# Patient Record
Sex: FEMALE | Race: BLACK OR AFRICAN AMERICAN | NOT HISPANIC OR LATINO
[De-identification: names, ages, dates, MRNs, and addresses within clinical notes are randomized per-mention and may not be internally consistent; named-entity substitution may affect disease eponyms.]

---

## 2021-06-03 ENCOUNTER — APPOINTMENT (OUTPATIENT)
Dept: OPHTHALMOLOGY | Facility: CLINIC | Age: 86
End: 2021-06-03

## 2021-06-21 PROBLEM — Z00.00 ENCOUNTER FOR PREVENTIVE HEALTH EXAMINATION: Status: ACTIVE | Noted: 2021-06-21

## 2021-06-22 ENCOUNTER — APPOINTMENT (OUTPATIENT)
Dept: OPHTHALMOLOGY | Facility: CLINIC | Age: 86
End: 2021-06-22
Payer: MEDICARE

## 2021-06-22 ENCOUNTER — NON-APPOINTMENT (OUTPATIENT)
Age: 86
End: 2021-06-22

## 2021-06-22 PROCEDURE — 92004 COMPRE OPH EXAM NEW PT 1/>: CPT

## 2021-07-08 ENCOUNTER — TRANSCRIPTION ENCOUNTER (OUTPATIENT)
Age: 86
End: 2021-07-08

## 2021-07-08 ENCOUNTER — APPOINTMENT (OUTPATIENT)
Dept: OPHTHALMOLOGY | Facility: CLINIC | Age: 86
End: 2021-07-08
Payer: MEDICARE

## 2021-07-08 ENCOUNTER — NON-APPOINTMENT (OUTPATIENT)
Age: 86
End: 2021-07-08

## 2021-07-08 PROCEDURE — 92025 CPTRIZED CORNEAL TOPOGRAPHY: CPT

## 2021-07-08 PROCEDURE — 92134 CPTRZ OPH DX IMG PST SGM RTA: CPT

## 2021-07-08 PROCEDURE — 92136 OPHTHALMIC BIOMETRY: CPT

## 2021-07-08 PROCEDURE — 92012 INTRM OPH EXAM EST PATIENT: CPT

## 2021-07-21 ENCOUNTER — NON-APPOINTMENT (OUTPATIENT)
Age: 86
End: 2021-07-21

## 2021-07-21 ENCOUNTER — APPOINTMENT (OUTPATIENT)
Dept: OPHTHALMOLOGY | Facility: CLINIC | Age: 86
End: 2021-07-21
Payer: MEDICARE

## 2021-07-21 PROCEDURE — 92014 COMPRE OPH EXAM EST PT 1/>: CPT

## 2021-07-21 PROCEDURE — 92235 FLUORESCEIN ANGRPH MLTIFRAME: CPT

## 2021-07-21 PROCEDURE — 92134 CPTRZ OPH DX IMG PST SGM RTA: CPT

## 2021-07-21 PROCEDURE — 92201 OPSCPY EXTND RTA DRAW UNI/BI: CPT

## 2021-08-19 ENCOUNTER — APPOINTMENT (OUTPATIENT)
Dept: OPHTHALMOLOGY | Facility: CLINIC | Age: 86
End: 2021-08-19

## 2021-08-20 ENCOUNTER — APPOINTMENT (OUTPATIENT)
Dept: OPHTHALMOLOGY | Facility: EYE CENTER | Age: 86
End: 2021-08-20

## 2021-08-21 ENCOUNTER — APPOINTMENT (OUTPATIENT)
Dept: OPHTHALMOLOGY | Facility: CLINIC | Age: 86
End: 2021-08-21

## 2021-09-07 ENCOUNTER — APPOINTMENT (OUTPATIENT)
Dept: CV DIAGNOSTICS | Facility: HOSPITAL | Age: 86
End: 2021-09-07
Payer: MEDICARE

## 2021-09-07 ENCOUNTER — OUTPATIENT (OUTPATIENT)
Dept: OUTPATIENT SERVICES | Facility: HOSPITAL | Age: 86
LOS: 1 days | End: 2021-09-07

## 2021-09-07 DIAGNOSIS — R07.9 CHEST PAIN, UNSPECIFIED: ICD-10-CM

## 2021-09-07 PROCEDURE — 93018 CV STRESS TEST I&R ONLY: CPT | Mod: GC

## 2021-09-07 PROCEDURE — 78452 HT MUSCLE IMAGE SPECT MULT: CPT | Mod: 26,MH

## 2021-09-07 PROCEDURE — 93016 CV STRESS TEST SUPVJ ONLY: CPT | Mod: GC

## 2021-10-21 ENCOUNTER — APPOINTMENT (OUTPATIENT)
Dept: OPHTHALMOLOGY | Facility: CLINIC | Age: 86
End: 2021-10-21
Payer: MEDICARE

## 2021-10-21 ENCOUNTER — NON-APPOINTMENT (OUTPATIENT)
Age: 86
End: 2021-10-21

## 2021-10-21 PROCEDURE — 99214 OFFICE O/P EST MOD 30 MIN: CPT

## 2021-11-03 ENCOUNTER — NON-APPOINTMENT (OUTPATIENT)
Age: 86
End: 2021-11-03

## 2021-11-03 ENCOUNTER — APPOINTMENT (OUTPATIENT)
Dept: OPHTHALMOLOGY | Facility: CLINIC | Age: 86
End: 2021-11-03
Payer: MEDICARE

## 2021-11-03 PROCEDURE — 92134 CPTRZ OPH DX IMG PST SGM RTA: CPT

## 2021-11-03 PROCEDURE — 67028 INJECTION EYE DRUG: CPT | Mod: LT

## 2021-11-03 PROCEDURE — 92012 INTRM OPH EXAM EST PATIENT: CPT | Mod: 25

## 2021-11-19 ENCOUNTER — APPOINTMENT (OUTPATIENT)
Dept: OPHTHALMOLOGY | Facility: EYE CENTER | Age: 86
End: 2021-11-19
Payer: MEDICARE

## 2021-11-19 ENCOUNTER — NON-APPOINTMENT (OUTPATIENT)
Age: 86
End: 2021-11-19

## 2021-11-19 PROCEDURE — 66984 XCAPSL CTRC RMVL W/O ECP: CPT | Mod: RT

## 2021-11-19 PROCEDURE — V2787: CPT | Mod: RT

## 2021-11-20 ENCOUNTER — NON-APPOINTMENT (OUTPATIENT)
Age: 86
End: 2021-11-20

## 2021-11-20 ENCOUNTER — APPOINTMENT (OUTPATIENT)
Dept: OPHTHALMOLOGY | Facility: CLINIC | Age: 86
End: 2021-11-20
Payer: MEDICARE

## 2021-11-20 PROCEDURE — 99024 POSTOP FOLLOW-UP VISIT: CPT

## 2021-11-24 ENCOUNTER — NON-APPOINTMENT (OUTPATIENT)
Age: 86
End: 2021-11-24

## 2021-11-24 ENCOUNTER — APPOINTMENT (OUTPATIENT)
Dept: OPHTHALMOLOGY | Facility: CLINIC | Age: 86
End: 2021-11-24
Payer: MEDICARE

## 2021-11-24 PROCEDURE — 99024 POSTOP FOLLOW-UP VISIT: CPT

## 2021-12-15 ENCOUNTER — APPOINTMENT (OUTPATIENT)
Dept: OPHTHALMOLOGY | Facility: CLINIC | Age: 86
End: 2021-12-15
Payer: MEDICARE

## 2021-12-15 ENCOUNTER — NON-APPOINTMENT (OUTPATIENT)
Age: 86
End: 2021-12-15

## 2021-12-15 PROCEDURE — 92134 CPTRZ OPH DX IMG PST SGM RTA: CPT

## 2021-12-15 PROCEDURE — 67028 INJECTION EYE DRUG: CPT | Mod: LT,79

## 2021-12-15 PROCEDURE — 92012 INTRM OPH EXAM EST PATIENT: CPT | Mod: 25,24

## 2021-12-21 ENCOUNTER — APPOINTMENT (OUTPATIENT)
Dept: OPHTHALMOLOGY | Facility: CLINIC | Age: 86
End: 2021-12-21
Payer: MEDICARE

## 2021-12-21 ENCOUNTER — NON-APPOINTMENT (OUTPATIENT)
Age: 86
End: 2021-12-21

## 2021-12-21 PROCEDURE — 99024 POSTOP FOLLOW-UP VISIT: CPT

## 2022-01-18 ENCOUNTER — NON-APPOINTMENT (OUTPATIENT)
Age: 87
End: 2022-01-18

## 2022-01-18 ENCOUNTER — APPOINTMENT (OUTPATIENT)
Dept: OPHTHALMOLOGY | Facility: CLINIC | Age: 87
End: 2022-01-18
Payer: MEDICARE

## 2022-01-18 PROCEDURE — 92014 COMPRE OPH EXAM EST PT 1/>: CPT | Mod: 24

## 2022-01-26 ENCOUNTER — NON-APPOINTMENT (OUTPATIENT)
Age: 87
End: 2022-01-26

## 2022-01-26 ENCOUNTER — APPOINTMENT (OUTPATIENT)
Dept: OPHTHALMOLOGY | Facility: EYE CENTER | Age: 87
End: 2022-01-26
Payer: MEDICARE

## 2022-01-26 PROCEDURE — 66984 XCAPSL CTRC RMVL W/O ECP: CPT | Mod: 79,LT

## 2022-01-26 PROCEDURE — V2787: CPT | Mod: LT

## 2022-01-27 ENCOUNTER — APPOINTMENT (OUTPATIENT)
Dept: OPHTHALMOLOGY | Facility: CLINIC | Age: 87
End: 2022-01-27
Payer: MEDICARE

## 2022-01-27 ENCOUNTER — NON-APPOINTMENT (OUTPATIENT)
Age: 87
End: 2022-01-27

## 2022-01-27 PROCEDURE — 99024 POSTOP FOLLOW-UP VISIT: CPT

## 2022-02-03 ENCOUNTER — NON-APPOINTMENT (OUTPATIENT)
Age: 87
End: 2022-02-03

## 2022-02-03 ENCOUNTER — APPOINTMENT (OUTPATIENT)
Dept: OPHTHALMOLOGY | Facility: CLINIC | Age: 87
End: 2022-02-03

## 2022-02-03 ENCOUNTER — APPOINTMENT (OUTPATIENT)
Dept: OPHTHALMOLOGY | Facility: CLINIC | Age: 87
End: 2022-02-03
Payer: MEDICARE

## 2022-02-03 PROCEDURE — 99024 POSTOP FOLLOW-UP VISIT: CPT

## 2022-03-03 ENCOUNTER — NON-APPOINTMENT (OUTPATIENT)
Age: 87
End: 2022-03-03

## 2022-03-03 ENCOUNTER — APPOINTMENT (OUTPATIENT)
Dept: OPHTHALMOLOGY | Facility: CLINIC | Age: 87
End: 2022-03-03
Payer: MEDICARE

## 2022-03-03 PROCEDURE — 99024 POSTOP FOLLOW-UP VISIT: CPT

## 2022-03-03 PROCEDURE — 92134 CPTRZ OPH DX IMG PST SGM RTA: CPT

## 2022-03-16 ENCOUNTER — NON-APPOINTMENT (OUTPATIENT)
Age: 87
End: 2022-03-16

## 2022-03-16 ENCOUNTER — APPOINTMENT (OUTPATIENT)
Dept: OPHTHALMOLOGY | Facility: CLINIC | Age: 87
End: 2022-03-16
Payer: MEDICARE

## 2022-03-16 PROCEDURE — 92012 INTRM OPH EXAM EST PATIENT: CPT | Mod: 25,24

## 2022-03-16 PROCEDURE — 92134 CPTRZ OPH DX IMG PST SGM RTA: CPT

## 2022-03-16 PROCEDURE — 67028 INJECTION EYE DRUG: CPT | Mod: LT,79

## 2022-03-31 ENCOUNTER — APPOINTMENT (OUTPATIENT)
Dept: OPHTHALMOLOGY | Facility: CLINIC | Age: 87
End: 2022-03-31
Payer: MEDICARE

## 2022-03-31 ENCOUNTER — NON-APPOINTMENT (OUTPATIENT)
Age: 87
End: 2022-03-31

## 2022-03-31 PROCEDURE — 66821 AFTER CATARACT LASER SURGERY: CPT | Mod: 79,RT

## 2022-05-04 ENCOUNTER — APPOINTMENT (OUTPATIENT)
Dept: OPHTHALMOLOGY | Facility: CLINIC | Age: 87
End: 2022-05-04
Payer: MEDICARE

## 2022-05-04 ENCOUNTER — NON-APPOINTMENT (OUTPATIENT)
Age: 87
End: 2022-05-04

## 2022-05-04 PROCEDURE — 92012 INTRM OPH EXAM EST PATIENT: CPT | Mod: 25,24

## 2022-05-04 PROCEDURE — 92134 CPTRZ OPH DX IMG PST SGM RTA: CPT

## 2022-05-04 PROCEDURE — 67210 TREATMENT OF RETINAL LESION: CPT | Mod: LT,78

## 2022-06-15 ENCOUNTER — APPOINTMENT (OUTPATIENT)
Dept: OPHTHALMOLOGY | Facility: CLINIC | Age: 87
End: 2022-06-15
Payer: MEDICARE

## 2022-06-15 ENCOUNTER — NON-APPOINTMENT (OUTPATIENT)
Age: 87
End: 2022-06-15

## 2022-06-15 PROCEDURE — 67028 INJECTION EYE DRUG: CPT | Mod: LT,79

## 2022-06-15 PROCEDURE — 92134 CPTRZ OPH DX IMG PST SGM RTA: CPT

## 2022-07-14 ENCOUNTER — NON-APPOINTMENT (OUTPATIENT)
Age: 87
End: 2022-07-14

## 2022-07-14 ENCOUNTER — APPOINTMENT (OUTPATIENT)
Dept: OPHTHALMOLOGY | Facility: CLINIC | Age: 87
End: 2022-07-14

## 2022-07-14 PROCEDURE — 99213 OFFICE O/P EST LOW 20 MIN: CPT | Mod: 24

## 2022-08-03 ENCOUNTER — APPOINTMENT (OUTPATIENT)
Dept: OPHTHALMOLOGY | Facility: CLINIC | Age: 87
End: 2022-08-03

## 2022-08-03 ENCOUNTER — NON-APPOINTMENT (OUTPATIENT)
Age: 87
End: 2022-08-03

## 2022-08-03 PROCEDURE — 92134 CPTRZ OPH DX IMG PST SGM RTA: CPT

## 2022-08-03 PROCEDURE — 92012 INTRM OPH EXAM EST PATIENT: CPT

## 2022-09-15 ENCOUNTER — NON-APPOINTMENT (OUTPATIENT)
Age: 87
End: 2022-09-15

## 2022-09-15 ENCOUNTER — APPOINTMENT (OUTPATIENT)
Dept: OPHTHALMOLOGY | Facility: CLINIC | Age: 87
End: 2022-09-15

## 2022-09-15 PROCEDURE — 66821 AFTER CATARACT LASER SURGERY: CPT | Mod: LT

## 2022-10-19 ENCOUNTER — NON-APPOINTMENT (OUTPATIENT)
Age: 87
End: 2022-10-19

## 2022-10-19 ENCOUNTER — APPOINTMENT (OUTPATIENT)
Dept: OPHTHALMOLOGY | Facility: CLINIC | Age: 87
End: 2022-10-19
Payer: MEDICARE

## 2022-10-19 PROCEDURE — 67028 INJECTION EYE DRUG: CPT | Mod: 79,LT

## 2022-10-19 PROCEDURE — 92014 COMPRE OPH EXAM EST PT 1/>: CPT | Mod: 25,24

## 2022-10-19 PROCEDURE — 92134 CPTRZ OPH DX IMG PST SGM RTA: CPT

## 2023-01-01 ENCOUNTER — TRANSCRIPTION ENCOUNTER (OUTPATIENT)
Age: 88
End: 2023-01-01

## 2023-01-01 ENCOUNTER — INPATIENT (INPATIENT)
Facility: HOSPITAL | Age: 88
LOS: 3 days | Discharge: HOME HEALTH | End: 2023-03-17
Attending: INTERNAL MEDICINE | Admitting: INTERNAL MEDICINE
Payer: MEDICARE

## 2023-01-01 ENCOUNTER — NON-APPOINTMENT (OUTPATIENT)
Age: 88
End: 2023-01-01

## 2023-01-01 ENCOUNTER — INPATIENT (INPATIENT)
Facility: HOSPITAL | Age: 88
LOS: 4 days | End: 2023-12-12
Attending: STUDENT IN AN ORGANIZED HEALTH CARE EDUCATION/TRAINING PROGRAM | Admitting: INTERNAL MEDICINE
Payer: MEDICARE

## 2023-01-01 ENCOUNTER — APPOINTMENT (OUTPATIENT)
Dept: OPHTHALMOLOGY | Facility: CLINIC | Age: 88
End: 2023-01-01

## 2023-01-01 ENCOUNTER — APPOINTMENT (OUTPATIENT)
Dept: OPHTHALMOLOGY | Facility: CLINIC | Age: 88
End: 2023-01-01
Payer: MEDICARE

## 2023-01-01 VITALS
HEIGHT: 66 IN | SYSTOLIC BLOOD PRESSURE: 157 MMHG | RESPIRATION RATE: 18 BRPM | WEIGHT: 179.9 LBS | OXYGEN SATURATION: 95 % | HEART RATE: 72 BPM | TEMPERATURE: 98 F | DIASTOLIC BLOOD PRESSURE: 69 MMHG

## 2023-01-01 VITALS — TEMPERATURE: 98 F

## 2023-01-01 VITALS
HEIGHT: 62 IN | DIASTOLIC BLOOD PRESSURE: 86 MMHG | RESPIRATION RATE: 26 BRPM | SYSTOLIC BLOOD PRESSURE: 186 MMHG | WEIGHT: 179.9 LBS | OXYGEN SATURATION: 97 % | HEART RATE: 105 BPM

## 2023-01-01 VITALS
HEART RATE: 79 BPM | OXYGEN SATURATION: 96 % | RESPIRATION RATE: 19 BRPM | TEMPERATURE: 98 F | SYSTOLIC BLOOD PRESSURE: 139 MMHG | DIASTOLIC BLOOD PRESSURE: 69 MMHG

## 2023-01-01 DIAGNOSIS — I46.9 CARDIAC ARREST, CAUSE UNSPECIFIED: ICD-10-CM

## 2023-01-01 DIAGNOSIS — R55 SYNCOPE AND COLLAPSE: ICD-10-CM

## 2023-01-01 DIAGNOSIS — N17.9 ACUTE KIDNEY FAILURE, UNSPECIFIED: ICD-10-CM

## 2023-01-01 DIAGNOSIS — I35.0 NONRHEUMATIC AORTIC (VALVE) STENOSIS: ICD-10-CM

## 2023-01-01 DIAGNOSIS — I10 ESSENTIAL (PRIMARY) HYPERTENSION: ICD-10-CM

## 2023-01-01 DIAGNOSIS — Z29.9 ENCOUNTER FOR PROPHYLACTIC MEASURES, UNSPECIFIED: ICD-10-CM

## 2023-01-01 DIAGNOSIS — N30.90 CYSTITIS, UNSPECIFIED WITHOUT HEMATURIA: ICD-10-CM

## 2023-01-01 DIAGNOSIS — I08.3 COMBINED RHEUMATIC DISORDERS OF MITRAL, AORTIC AND TRICUSPID VALVES: ICD-10-CM

## 2023-01-01 DIAGNOSIS — E78.5 HYPERLIPIDEMIA, UNSPECIFIED: ICD-10-CM

## 2023-01-01 DIAGNOSIS — R26.0 ATAXIC GAIT: ICD-10-CM

## 2023-01-01 DIAGNOSIS — Z79.82 LONG TERM (CURRENT) USE OF ASPIRIN: ICD-10-CM

## 2023-01-01 DIAGNOSIS — E86.0 DEHYDRATION: ICD-10-CM

## 2023-01-01 DIAGNOSIS — Z88.5 ALLERGY STATUS TO NARCOTIC AGENT: ICD-10-CM

## 2023-01-01 DIAGNOSIS — I42.9 CARDIOMYOPATHY, UNSPECIFIED: ICD-10-CM

## 2023-01-01 LAB
A1C WITH ESTIMATED AVERAGE GLUCOSE RESULT: 5.8 % — HIGH (ref 4–5.6)
A1C WITH ESTIMATED AVERAGE GLUCOSE RESULT: 5.8 % — HIGH (ref 4–5.6)
ALBUMIN SERPL ELPH-MCNC: 1.5 G/DL — LOW (ref 3.3–5)
ALBUMIN SERPL ELPH-MCNC: 1.5 G/DL — LOW (ref 3.3–5)
ALBUMIN SERPL ELPH-MCNC: 1.8 G/DL — LOW (ref 3.3–5)
ALBUMIN SERPL ELPH-MCNC: 1.8 G/DL — LOW (ref 3.3–5)
ALBUMIN SERPL ELPH-MCNC: 1.9 G/DL — LOW (ref 3.3–5)
ALBUMIN SERPL ELPH-MCNC: 2.1 G/DL — LOW (ref 3.3–5)
ALBUMIN SERPL ELPH-MCNC: 2.4 G/DL — LOW (ref 3.3–5)
ALBUMIN SERPL ELPH-MCNC: 2.4 G/DL — LOW (ref 3.3–5)
ALBUMIN SERPL ELPH-MCNC: 2.8 G/DL — LOW (ref 3.3–5)
ALBUMIN SERPL ELPH-MCNC: 2.8 G/DL — LOW (ref 3.3–5)
ALBUMIN SERPL ELPH-MCNC: 2.9 G/DL — LOW (ref 3.3–5)
ALBUMIN SERPL ELPH-MCNC: 3.3 G/DL — SIGNIFICANT CHANGE UP (ref 3.3–5)
ALP SERPL-CCNC: 100 U/L — SIGNIFICANT CHANGE UP (ref 40–120)
ALP SERPL-CCNC: 101 U/L — SIGNIFICANT CHANGE UP (ref 40–120)
ALP SERPL-CCNC: 101 U/L — SIGNIFICANT CHANGE UP (ref 40–120)
ALP SERPL-CCNC: 61 U/L — SIGNIFICANT CHANGE UP (ref 40–120)
ALP SERPL-CCNC: 61 U/L — SIGNIFICANT CHANGE UP (ref 40–120)
ALP SERPL-CCNC: 75 U/L — SIGNIFICANT CHANGE UP (ref 40–120)
ALP SERPL-CCNC: 81 U/L — SIGNIFICANT CHANGE UP (ref 40–120)
ALP SERPL-CCNC: 81 U/L — SIGNIFICANT CHANGE UP (ref 40–120)
ALP SERPL-CCNC: 84 U/L — SIGNIFICANT CHANGE UP (ref 40–120)
ALP SERPL-CCNC: 84 U/L — SIGNIFICANT CHANGE UP (ref 40–120)
ALP SERPL-CCNC: 85 U/L — SIGNIFICANT CHANGE UP (ref 40–120)
ALP SERPL-CCNC: 89 U/L — SIGNIFICANT CHANGE UP (ref 40–120)
ALP SERPL-CCNC: 89 U/L — SIGNIFICANT CHANGE UP (ref 40–120)
ALP SERPL-CCNC: 90 U/L — SIGNIFICANT CHANGE UP (ref 40–120)
ALP SERPL-CCNC: 90 U/L — SIGNIFICANT CHANGE UP (ref 40–120)
ALP SERPL-CCNC: 97 U/L — SIGNIFICANT CHANGE UP (ref 40–120)
ALP SERPL-CCNC: 97 U/L — SIGNIFICANT CHANGE UP (ref 40–120)
ALT FLD-CCNC: 1126 U/L — HIGH (ref 12–78)
ALT FLD-CCNC: 1126 U/L — HIGH (ref 12–78)
ALT FLD-CCNC: 13 U/L — SIGNIFICANT CHANGE UP (ref 12–78)
ALT FLD-CCNC: 20 U/L — SIGNIFICANT CHANGE UP (ref 12–78)
ALT FLD-CCNC: 231 U/L — HIGH (ref 12–78)
ALT FLD-CCNC: 231 U/L — HIGH (ref 12–78)
ALT FLD-CCNC: 26 U/L — SIGNIFICANT CHANGE UP (ref 12–78)
ALT FLD-CCNC: 26 U/L — SIGNIFICANT CHANGE UP (ref 12–78)
ALT FLD-CCNC: 340 U/L — HIGH (ref 12–78)
ALT FLD-CCNC: 340 U/L — HIGH (ref 12–78)
ALT FLD-CCNC: 472 U/L — HIGH (ref 12–78)
ALT FLD-CCNC: 472 U/L — HIGH (ref 12–78)
ALT FLD-CCNC: 518 U/L — HIGH (ref 12–78)
ALT FLD-CCNC: 518 U/L — HIGH (ref 12–78)
ALT FLD-CCNC: 631 U/L — HIGH (ref 12–78)
ALT FLD-CCNC: 631 U/L — HIGH (ref 12–78)
ALT FLD-CCNC: 784 U/L — HIGH (ref 12–78)
ALT FLD-CCNC: 784 U/L — HIGH (ref 12–78)
ALT FLD-CCNC: 868 U/L — HIGH (ref 12–78)
ALT FLD-CCNC: 868 U/L — HIGH (ref 12–78)
ANION GAP SERPL CALC-SCNC: 11 MMOL/L — SIGNIFICANT CHANGE UP (ref 5–17)
ANION GAP SERPL CALC-SCNC: 12 MMOL/L — SIGNIFICANT CHANGE UP (ref 5–17)
ANION GAP SERPL CALC-SCNC: 13 MMOL/L — SIGNIFICANT CHANGE UP (ref 5–17)
ANION GAP SERPL CALC-SCNC: 4 MMOL/L — LOW (ref 5–17)
ANION GAP SERPL CALC-SCNC: 4 MMOL/L — LOW (ref 5–17)
ANION GAP SERPL CALC-SCNC: 6 MMOL/L — SIGNIFICANT CHANGE UP (ref 5–17)
ANION GAP SERPL CALC-SCNC: 7 MMOL/L — SIGNIFICANT CHANGE UP (ref 5–17)
ANION GAP SERPL CALC-SCNC: 8 MMOL/L — SIGNIFICANT CHANGE UP (ref 5–17)
ANION GAP SERPL CALC-SCNC: 8 MMOL/L — SIGNIFICANT CHANGE UP (ref 5–17)
ANION GAP SERPL CALC-SCNC: 9 MMOL/L — SIGNIFICANT CHANGE UP (ref 5–17)
ANION GAP SERPL CALC-SCNC: 9 MMOL/L — SIGNIFICANT CHANGE UP (ref 5–17)
ANISOCYTOSIS BLD QL: SLIGHT — SIGNIFICANT CHANGE UP
ANISOCYTOSIS BLD QL: SLIGHT — SIGNIFICANT CHANGE UP
APPEARANCE UR: ABNORMAL
APTT BLD: 29.9 SEC — SIGNIFICANT CHANGE UP (ref 24.5–35.6)
APTT BLD: 29.9 SEC — SIGNIFICANT CHANGE UP (ref 24.5–35.6)
AST SERPL-CCNC: 1268 U/L — HIGH (ref 15–37)
AST SERPL-CCNC: 1268 U/L — HIGH (ref 15–37)
AST SERPL-CCNC: 14 U/L — LOW (ref 15–37)
AST SERPL-CCNC: 153 U/L — HIGH (ref 15–37)
AST SERPL-CCNC: 153 U/L — HIGH (ref 15–37)
AST SERPL-CCNC: 1718 U/L — HIGH (ref 15–37)
AST SERPL-CCNC: 1718 U/L — HIGH (ref 15–37)
AST SERPL-CCNC: 20 U/L — SIGNIFICANT CHANGE UP (ref 15–37)
AST SERPL-CCNC: 318 U/L — HIGH (ref 15–37)
AST SERPL-CCNC: 318 U/L — HIGH (ref 15–37)
AST SERPL-CCNC: 51 U/L — HIGH (ref 15–37)
AST SERPL-CCNC: 51 U/L — HIGH (ref 15–37)
AST SERPL-CCNC: 519 U/L — HIGH (ref 15–37)
AST SERPL-CCNC: 519 U/L — HIGH (ref 15–37)
AST SERPL-CCNC: 735 U/L — HIGH (ref 15–37)
AST SERPL-CCNC: 735 U/L — HIGH (ref 15–37)
AST SERPL-CCNC: 884 U/L — HIGH (ref 15–37)
AST SERPL-CCNC: 884 U/L — HIGH (ref 15–37)
AST SERPL-CCNC: 92 U/L — HIGH (ref 15–37)
AST SERPL-CCNC: 92 U/L — HIGH (ref 15–37)
BACTERIA # UR AUTO: ABNORMAL
BACTERIA # UR AUTO: ABNORMAL /HPF
BACTERIA # UR AUTO: ABNORMAL /HPF
BASE EXCESS BLDA CALC-SCNC: -6.6 MMOL/L — LOW (ref -2–3)
BASE EXCESS BLDA CALC-SCNC: -6.6 MMOL/L — LOW (ref -2–3)
BASE EXCESS BLDV CALC-SCNC: -5.9 MMOL/L — LOW (ref -2–3)
BASE EXCESS BLDV CALC-SCNC: -5.9 MMOL/L — LOW (ref -2–3)
BASOPHILS # BLD AUTO: 0 K/UL — SIGNIFICANT CHANGE UP (ref 0–0.2)
BASOPHILS # BLD AUTO: 0.03 K/UL — SIGNIFICANT CHANGE UP (ref 0–0.2)
BASOPHILS # BLD AUTO: 0.04 K/UL — SIGNIFICANT CHANGE UP (ref 0–0.2)
BASOPHILS # BLD AUTO: 0.05 K/UL — SIGNIFICANT CHANGE UP (ref 0–0.2)
BASOPHILS # BLD AUTO: 0.05 K/UL — SIGNIFICANT CHANGE UP (ref 0–0.2)
BASOPHILS # BLD AUTO: 0.06 K/UL — SIGNIFICANT CHANGE UP (ref 0–0.2)
BASOPHILS NFR BLD AUTO: 0 % — SIGNIFICANT CHANGE UP (ref 0–2)
BASOPHILS NFR BLD AUTO: 0.3 % — SIGNIFICANT CHANGE UP (ref 0–2)
BASOPHILS NFR BLD AUTO: 0.3 % — SIGNIFICANT CHANGE UP (ref 0–2)
BASOPHILS NFR BLD AUTO: 0.4 % — SIGNIFICANT CHANGE UP (ref 0–2)
BASOPHILS NFR BLD AUTO: 0.6 % — SIGNIFICANT CHANGE UP (ref 0–2)
BASOPHILS NFR BLD AUTO: 0.8 % — SIGNIFICANT CHANGE UP (ref 0–2)
BILIRUB SERPL-MCNC: 0.3 MG/DL — SIGNIFICANT CHANGE UP (ref 0.2–1.2)
BILIRUB SERPL-MCNC: 0.4 MG/DL — SIGNIFICANT CHANGE UP (ref 0.2–1.2)
BILIRUB SERPL-MCNC: 0.5 MG/DL — SIGNIFICANT CHANGE UP (ref 0.2–1.2)
BILIRUB SERPL-MCNC: 0.6 MG/DL — SIGNIFICANT CHANGE UP (ref 0.2–1.2)
BILIRUB UR-MCNC: NEGATIVE — SIGNIFICANT CHANGE UP
BLD GP AB SCN SERPL QL: SIGNIFICANT CHANGE UP
BLD GP AB SCN SERPL QL: SIGNIFICANT CHANGE UP
BLOOD GAS COMMENTS ARTERIAL: SIGNIFICANT CHANGE UP
BLOOD GAS COMMENTS ARTERIAL: SIGNIFICANT CHANGE UP
BLOOD GAS COMMENTS, VENOUS: SIGNIFICANT CHANGE UP
BLOOD GAS COMMENTS, VENOUS: SIGNIFICANT CHANGE UP
BUN SERPL-MCNC: 34 MG/DL — HIGH (ref 7–23)
BUN SERPL-MCNC: 37 MG/DL — HIGH (ref 7–23)
BUN SERPL-MCNC: 37 MG/DL — HIGH (ref 7–23)
BUN SERPL-MCNC: 39 MG/DL — HIGH (ref 7–23)
BUN SERPL-MCNC: 40 MG/DL — HIGH (ref 7–23)
BUN SERPL-MCNC: 49 MG/DL — HIGH (ref 7–23)
BUN SERPL-MCNC: 49 MG/DL — HIGH (ref 7–23)
BUN SERPL-MCNC: 54 MG/DL — HIGH (ref 7–23)
BUN SERPL-MCNC: 54 MG/DL — HIGH (ref 7–23)
BUN SERPL-MCNC: 58 MG/DL — HIGH (ref 7–23)
BUN SERPL-MCNC: 58 MG/DL — HIGH (ref 7–23)
BUN SERPL-MCNC: 61 MG/DL — HIGH (ref 7–23)
BUN SERPL-MCNC: 61 MG/DL — HIGH (ref 7–23)
BUN SERPL-MCNC: 65 MG/DL — HIGH (ref 7–23)
BUN SERPL-MCNC: 65 MG/DL — HIGH (ref 7–23)
BUN SERPL-MCNC: 75 MG/DL — HIGH (ref 7–23)
BUN SERPL-MCNC: 75 MG/DL — HIGH (ref 7–23)
BUN SERPL-MCNC: 76 MG/DL — HIGH (ref 7–23)
BUN SERPL-MCNC: 76 MG/DL — HIGH (ref 7–23)
CALCIUM SERPL-MCNC: 7.2 MG/DL — LOW (ref 8.5–10.1)
CALCIUM SERPL-MCNC: 7.2 MG/DL — LOW (ref 8.5–10.1)
CALCIUM SERPL-MCNC: 7.7 MG/DL — LOW (ref 8.5–10.1)
CALCIUM SERPL-MCNC: 7.7 MG/DL — LOW (ref 8.5–10.1)
CALCIUM SERPL-MCNC: 7.8 MG/DL — LOW (ref 8.5–10.1)
CALCIUM SERPL-MCNC: 7.8 MG/DL — LOW (ref 8.5–10.1)
CALCIUM SERPL-MCNC: 7.9 MG/DL — LOW (ref 8.5–10.1)
CALCIUM SERPL-MCNC: 8 MG/DL — LOW (ref 8.5–10.1)
CALCIUM SERPL-MCNC: 8.3 MG/DL — LOW (ref 8.5–10.1)
CALCIUM SERPL-MCNC: 8.3 MG/DL — LOW (ref 8.5–10.1)
CALCIUM SERPL-MCNC: 8.9 MG/DL — SIGNIFICANT CHANGE UP (ref 8.5–10.1)
CALCIUM SERPL-MCNC: 9.2 MG/DL — SIGNIFICANT CHANGE UP (ref 8.5–10.1)
CALCIUM SERPL-MCNC: 9.5 MG/DL — SIGNIFICANT CHANGE UP (ref 8.5–10.1)
CHLORIDE SERPL-SCNC: 100 MMOL/L — SIGNIFICANT CHANGE UP (ref 96–108)
CHLORIDE SERPL-SCNC: 100 MMOL/L — SIGNIFICANT CHANGE UP (ref 96–108)
CHLORIDE SERPL-SCNC: 101 MMOL/L — SIGNIFICANT CHANGE UP (ref 96–108)
CHLORIDE SERPL-SCNC: 102 MMOL/L — SIGNIFICANT CHANGE UP (ref 96–108)
CHLORIDE SERPL-SCNC: 106 MMOL/L — SIGNIFICANT CHANGE UP (ref 96–108)
CHLORIDE SERPL-SCNC: 108 MMOL/L — SIGNIFICANT CHANGE UP (ref 96–108)
CHLORIDE SERPL-SCNC: 109 MMOL/L — HIGH (ref 96–108)
CHLORIDE SERPL-SCNC: 110 MMOL/L — HIGH (ref 96–108)
CHLORIDE SERPL-SCNC: 98 MMOL/L — SIGNIFICANT CHANGE UP (ref 96–108)
CHOLEST SERPL-MCNC: 174 MG/DL — SIGNIFICANT CHANGE UP
CK MB BLD-MCNC: 1.5 % — SIGNIFICANT CHANGE UP (ref 0–3.5)
CK MB BLD-MCNC: 1.5 % — SIGNIFICANT CHANGE UP (ref 0–3.5)
CK MB CFR SERPL CALC: 14.3 NG/ML — HIGH (ref 0.5–3.6)
CK MB CFR SERPL CALC: 14.3 NG/ML — HIGH (ref 0.5–3.6)
CK SERPL-CCNC: 962 U/L — HIGH (ref 26–192)
CK SERPL-CCNC: 962 U/L — HIGH (ref 26–192)
CO2 BLDA-SCNC: 22 MMOL/L — SIGNIFICANT CHANGE UP (ref 19–24)
CO2 BLDA-SCNC: 22 MMOL/L — SIGNIFICANT CHANGE UP (ref 19–24)
CO2 BLDV-SCNC: 22 MMOL/L — SIGNIFICANT CHANGE UP (ref 22–26)
CO2 BLDV-SCNC: 22 MMOL/L — SIGNIFICANT CHANGE UP (ref 22–26)
CO2 SERPL-SCNC: 17 MMOL/L — LOW (ref 22–31)
CO2 SERPL-SCNC: 17 MMOL/L — LOW (ref 22–31)
CO2 SERPL-SCNC: 18 MMOL/L — LOW (ref 22–31)
CO2 SERPL-SCNC: 18 MMOL/L — LOW (ref 22–31)
CO2 SERPL-SCNC: 19 MMOL/L — LOW (ref 22–31)
CO2 SERPL-SCNC: 20 MMOL/L — LOW (ref 22–31)
CO2 SERPL-SCNC: 21 MMOL/L — LOW (ref 22–31)
CO2 SERPL-SCNC: 23 MMOL/L — SIGNIFICANT CHANGE UP (ref 22–31)
CO2 SERPL-SCNC: 24 MMOL/L — SIGNIFICANT CHANGE UP (ref 22–31)
CO2 SERPL-SCNC: 25 MMOL/L — SIGNIFICANT CHANGE UP (ref 22–31)
CO2 SERPL-SCNC: 25 MMOL/L — SIGNIFICANT CHANGE UP (ref 22–31)
CO2 SERPL-SCNC: 26 MMOL/L — SIGNIFICANT CHANGE UP (ref 22–31)
COLOR SPEC: YELLOW — SIGNIFICANT CHANGE UP
CREAT SERPL-MCNC: 1.37 MG/DL — HIGH (ref 0.5–1.3)
CREAT SERPL-MCNC: 1.42 MG/DL — HIGH (ref 0.5–1.3)
CREAT SERPL-MCNC: 1.55 MG/DL — HIGH (ref 0.5–1.3)
CREAT SERPL-MCNC: 1.71 MG/DL — HIGH (ref 0.5–1.3)
CREAT SERPL-MCNC: 2.14 MG/DL — HIGH (ref 0.5–1.3)
CREAT SERPL-MCNC: 2.14 MG/DL — HIGH (ref 0.5–1.3)
CREAT SERPL-MCNC: 2.15 MG/DL — HIGH (ref 0.5–1.3)
CREAT SERPL-MCNC: 2.15 MG/DL — HIGH (ref 0.5–1.3)
CREAT SERPL-MCNC: 2.51 MG/DL — HIGH (ref 0.5–1.3)
CREAT SERPL-MCNC: 2.51 MG/DL — HIGH (ref 0.5–1.3)
CREAT SERPL-MCNC: 2.97 MG/DL — HIGH (ref 0.5–1.3)
CREAT SERPL-MCNC: 2.97 MG/DL — HIGH (ref 0.5–1.3)
CREAT SERPL-MCNC: 3.32 MG/DL — HIGH (ref 0.5–1.3)
CREAT SERPL-MCNC: 3.32 MG/DL — HIGH (ref 0.5–1.3)
CREAT SERPL-MCNC: 3.64 MG/DL — HIGH (ref 0.5–1.3)
CREAT SERPL-MCNC: 3.64 MG/DL — HIGH (ref 0.5–1.3)
CREAT SERPL-MCNC: 3.87 MG/DL — HIGH (ref 0.5–1.3)
CREAT SERPL-MCNC: 3.87 MG/DL — HIGH (ref 0.5–1.3)
CREAT SERPL-MCNC: 3.93 MG/DL — HIGH (ref 0.5–1.3)
CREAT SERPL-MCNC: 3.93 MG/DL — HIGH (ref 0.5–1.3)
CREAT SERPL-MCNC: 4.22 MG/DL — HIGH (ref 0.5–1.3)
CREAT SERPL-MCNC: 4.22 MG/DL — HIGH (ref 0.5–1.3)
CULTURE RESULTS: SIGNIFICANT CHANGE UP
D DIMER BLD IA.RAPID-MCNC: 760 NG/ML DDU — HIGH
D DIMER BLD IA.RAPID-MCNC: 760 NG/ML DDU — HIGH
DIFF PNL FLD: ABNORMAL
EGFR: 10 ML/MIN/1.73M2 — LOW
EGFR: 10 ML/MIN/1.73M2 — LOW
EGFR: 11 ML/MIN/1.73M2 — LOW
EGFR: 12 ML/MIN/1.73M2 — LOW
EGFR: 12 ML/MIN/1.73M2 — LOW
EGFR: 13 ML/MIN/1.73M2 — LOW
EGFR: 13 ML/MIN/1.73M2 — LOW
EGFR: 15 ML/MIN/1.73M2 — LOW
EGFR: 15 ML/MIN/1.73M2 — LOW
EGFR: 18 ML/MIN/1.73M2 — LOW
EGFR: 18 ML/MIN/1.73M2 — LOW
EGFR: 22 ML/MIN/1.73M2 — LOW
EGFR: 29 ML/MIN/1.73M2 — LOW
EGFR: 32 ML/MIN/1.73M2 — LOW
EGFR: 36 ML/MIN/1.73M2 — LOW
EGFR: 37 ML/MIN/1.73M2 — LOW
EOSINOPHIL # BLD AUTO: 0 K/UL — SIGNIFICANT CHANGE UP (ref 0–0.5)
EOSINOPHIL # BLD AUTO: 0.05 K/UL — SIGNIFICANT CHANGE UP (ref 0–0.5)
EOSINOPHIL # BLD AUTO: 0.05 K/UL — SIGNIFICANT CHANGE UP (ref 0–0.5)
EOSINOPHIL # BLD AUTO: 0.13 K/UL — SIGNIFICANT CHANGE UP (ref 0–0.5)
EOSINOPHIL # BLD AUTO: 0.13 K/UL — SIGNIFICANT CHANGE UP (ref 0–0.5)
EOSINOPHIL # BLD AUTO: 0.18 K/UL — SIGNIFICANT CHANGE UP (ref 0–0.5)
EOSINOPHIL # BLD AUTO: 0.29 K/UL — SIGNIFICANT CHANGE UP (ref 0–0.5)
EOSINOPHIL # BLD AUTO: 0.39 K/UL — SIGNIFICANT CHANGE UP (ref 0–0.5)
EOSINOPHIL NFR BLD AUTO: 0 % — SIGNIFICANT CHANGE UP (ref 0–6)
EOSINOPHIL NFR BLD AUTO: 0.4 % — SIGNIFICANT CHANGE UP (ref 0–6)
EOSINOPHIL NFR BLD AUTO: 0.4 % — SIGNIFICANT CHANGE UP (ref 0–6)
EOSINOPHIL NFR BLD AUTO: 1 % — SIGNIFICANT CHANGE UP (ref 0–6)
EOSINOPHIL NFR BLD AUTO: 1 % — SIGNIFICANT CHANGE UP (ref 0–6)
EOSINOPHIL NFR BLD AUTO: 2.5 % — SIGNIFICANT CHANGE UP (ref 0–6)
EOSINOPHIL NFR BLD AUTO: 4.4 % — SIGNIFICANT CHANGE UP (ref 0–6)
EOSINOPHIL NFR BLD AUTO: 5.2 % — SIGNIFICANT CHANGE UP (ref 0–6)
EPI CELLS # UR: ABNORMAL
EPI CELLS # UR: PRESENT
EPI CELLS # UR: PRESENT
ESTIMATED AVERAGE GLUCOSE: 120 MG/DL — HIGH (ref 68–114)
ESTIMATED AVERAGE GLUCOSE: 120 MG/DL — HIGH (ref 68–114)
FLUAV AG NPH QL: SIGNIFICANT CHANGE UP
FLUBV AG NPH QL: SIGNIFICANT CHANGE UP
GAS PNL BLDA: SIGNIFICANT CHANGE UP
GAS PNL BLDV: SIGNIFICANT CHANGE UP
GAS PNL BLDV: SIGNIFICANT CHANGE UP
GLUCOSE BLDC GLUCOMTR-MCNC: 100 MG/DL — HIGH (ref 70–99)
GLUCOSE BLDC GLUCOMTR-MCNC: 100 MG/DL — HIGH (ref 70–99)
GLUCOSE BLDC GLUCOMTR-MCNC: 101 MG/DL — HIGH (ref 70–99)
GLUCOSE BLDC GLUCOMTR-MCNC: 120 MG/DL — HIGH (ref 70–99)
GLUCOSE BLDC GLUCOMTR-MCNC: 120 MG/DL — HIGH (ref 70–99)
GLUCOSE BLDC GLUCOMTR-MCNC: 121 MG/DL — HIGH (ref 70–99)
GLUCOSE BLDC GLUCOMTR-MCNC: 121 MG/DL — HIGH (ref 70–99)
GLUCOSE BLDC GLUCOMTR-MCNC: 125 MG/DL — HIGH (ref 70–99)
GLUCOSE BLDC GLUCOMTR-MCNC: 125 MG/DL — HIGH (ref 70–99)
GLUCOSE BLDC GLUCOMTR-MCNC: 126 MG/DL — HIGH (ref 70–99)
GLUCOSE BLDC GLUCOMTR-MCNC: 126 MG/DL — HIGH (ref 70–99)
GLUCOSE BLDC GLUCOMTR-MCNC: 129 MG/DL — HIGH (ref 70–99)
GLUCOSE BLDC GLUCOMTR-MCNC: 129 MG/DL — HIGH (ref 70–99)
GLUCOSE BLDC GLUCOMTR-MCNC: 136 MG/DL — HIGH (ref 70–99)
GLUCOSE BLDC GLUCOMTR-MCNC: 136 MG/DL — HIGH (ref 70–99)
GLUCOSE BLDC GLUCOMTR-MCNC: 138 MG/DL — HIGH (ref 70–99)
GLUCOSE BLDC GLUCOMTR-MCNC: 138 MG/DL — HIGH (ref 70–99)
GLUCOSE BLDC GLUCOMTR-MCNC: 139 MG/DL — HIGH (ref 70–99)
GLUCOSE BLDC GLUCOMTR-MCNC: 139 MG/DL — HIGH (ref 70–99)
GLUCOSE BLDC GLUCOMTR-MCNC: 146 MG/DL — HIGH (ref 70–99)
GLUCOSE BLDC GLUCOMTR-MCNC: 146 MG/DL — HIGH (ref 70–99)
GLUCOSE BLDC GLUCOMTR-MCNC: 155 MG/DL — HIGH (ref 70–99)
GLUCOSE BLDC GLUCOMTR-MCNC: 155 MG/DL — HIGH (ref 70–99)
GLUCOSE BLDC GLUCOMTR-MCNC: 157 MG/DL — HIGH (ref 70–99)
GLUCOSE BLDC GLUCOMTR-MCNC: 157 MG/DL — HIGH (ref 70–99)
GLUCOSE BLDC GLUCOMTR-MCNC: 165 MG/DL — HIGH (ref 70–99)
GLUCOSE BLDC GLUCOMTR-MCNC: 165 MG/DL — HIGH (ref 70–99)
GLUCOSE BLDC GLUCOMTR-MCNC: 186 MG/DL — HIGH (ref 70–99)
GLUCOSE BLDC GLUCOMTR-MCNC: 186 MG/DL — HIGH (ref 70–99)
GLUCOSE BLDC GLUCOMTR-MCNC: 194 MG/DL — HIGH (ref 70–99)
GLUCOSE BLDC GLUCOMTR-MCNC: 194 MG/DL — HIGH (ref 70–99)
GLUCOSE BLDC GLUCOMTR-MCNC: 314 MG/DL — HIGH (ref 70–99)
GLUCOSE BLDC GLUCOMTR-MCNC: 314 MG/DL — HIGH (ref 70–99)
GLUCOSE BLDC GLUCOMTR-MCNC: 341 MG/DL — HIGH (ref 70–99)
GLUCOSE BLDC GLUCOMTR-MCNC: 341 MG/DL — HIGH (ref 70–99)
GLUCOSE BLDC GLUCOMTR-MCNC: 99 MG/DL — SIGNIFICANT CHANGE UP (ref 70–99)
GLUCOSE BLDC GLUCOMTR-MCNC: 99 MG/DL — SIGNIFICANT CHANGE UP (ref 70–99)
GLUCOSE SERPL-MCNC: 102 MG/DL — HIGH (ref 70–99)
GLUCOSE SERPL-MCNC: 108 MG/DL — HIGH (ref 70–99)
GLUCOSE SERPL-MCNC: 109 MG/DL — HIGH (ref 70–99)
GLUCOSE SERPL-MCNC: 109 MG/DL — HIGH (ref 70–99)
GLUCOSE SERPL-MCNC: 119 MG/DL — HIGH (ref 70–99)
GLUCOSE SERPL-MCNC: 119 MG/DL — HIGH (ref 70–99)
GLUCOSE SERPL-MCNC: 133 MG/DL — HIGH (ref 70–99)
GLUCOSE SERPL-MCNC: 133 MG/DL — HIGH (ref 70–99)
GLUCOSE SERPL-MCNC: 135 MG/DL — HIGH (ref 70–99)
GLUCOSE SERPL-MCNC: 135 MG/DL — HIGH (ref 70–99)
GLUCOSE SERPL-MCNC: 151 MG/DL — HIGH (ref 70–99)
GLUCOSE SERPL-MCNC: 151 MG/DL — HIGH (ref 70–99)
GLUCOSE SERPL-MCNC: 164 MG/DL — HIGH (ref 70–99)
GLUCOSE SERPL-MCNC: 164 MG/DL — HIGH (ref 70–99)
GLUCOSE SERPL-MCNC: 179 MG/DL — HIGH (ref 70–99)
GLUCOSE SERPL-MCNC: 179 MG/DL — HIGH (ref 70–99)
GLUCOSE SERPL-MCNC: 200 MG/DL — HIGH (ref 70–99)
GLUCOSE SERPL-MCNC: 200 MG/DL — HIGH (ref 70–99)
GLUCOSE SERPL-MCNC: 398 MG/DL — HIGH (ref 70–99)
GLUCOSE SERPL-MCNC: 398 MG/DL — HIGH (ref 70–99)
GLUCOSE SERPL-MCNC: 94 MG/DL — SIGNIFICANT CHANGE UP (ref 70–99)
GLUCOSE SERPL-MCNC: 99 MG/DL — SIGNIFICANT CHANGE UP (ref 70–99)
GLUCOSE UR QL: NEGATIVE MG/DL — SIGNIFICANT CHANGE UP
GRAM STN FLD: SIGNIFICANT CHANGE UP
HCO3 BLDA-SCNC: 20 MMOL/L — LOW (ref 21–28)
HCO3 BLDA-SCNC: 20 MMOL/L — LOW (ref 21–28)
HCO3 BLDV-SCNC: 21 MMOL/L — LOW (ref 22–28)
HCO3 BLDV-SCNC: 21 MMOL/L — LOW (ref 22–28)
HCT VFR BLD CALC: 23.5 % — LOW (ref 34.5–45)
HCT VFR BLD CALC: 23.5 % — LOW (ref 34.5–45)
HCT VFR BLD CALC: 25.9 % — LOW (ref 34.5–45)
HCT VFR BLD CALC: 25.9 % — LOW (ref 34.5–45)
HCT VFR BLD CALC: 27.6 % — LOW (ref 34.5–45)
HCT VFR BLD CALC: 27.6 % — LOW (ref 34.5–45)
HCT VFR BLD CALC: 29 % — LOW (ref 34.5–45)
HCT VFR BLD CALC: 29 % — LOW (ref 34.5–45)
HCT VFR BLD CALC: 29.4 % — LOW (ref 34.5–45)
HCT VFR BLD CALC: 29.4 % — LOW (ref 34.5–45)
HCT VFR BLD CALC: 33.3 % — LOW (ref 34.5–45)
HCT VFR BLD CALC: 34.4 % — LOW (ref 34.5–45)
HCT VFR BLD CALC: 34.4 % — LOW (ref 34.5–45)
HCT VFR BLD CALC: 34.5 % — SIGNIFICANT CHANGE UP (ref 34.5–45)
HCT VFR BLD CALC: 35.8 % — SIGNIFICANT CHANGE UP (ref 34.5–45)
HCT VFR BLD CALC: 38.6 % — SIGNIFICANT CHANGE UP (ref 34.5–45)
HCT VFR BLD CALC: 40.1 % — SIGNIFICANT CHANGE UP (ref 34.5–45)
HCT VFR BLD CALC: 40.1 % — SIGNIFICANT CHANGE UP (ref 34.5–45)
HDLC SERPL-MCNC: 51 MG/DL — SIGNIFICANT CHANGE UP
HGB BLD-MCNC: 10.6 G/DL — LOW (ref 11.5–15.5)
HGB BLD-MCNC: 10.7 G/DL — LOW (ref 11.5–15.5)
HGB BLD-MCNC: 10.8 G/DL — LOW (ref 11.5–15.5)
HGB BLD-MCNC: 10.8 G/DL — LOW (ref 11.5–15.5)
HGB BLD-MCNC: 10.9 G/DL — LOW (ref 11.5–15.5)
HGB BLD-MCNC: 10.9 G/DL — LOW (ref 11.5–15.5)
HGB BLD-MCNC: 11.5 G/DL — SIGNIFICANT CHANGE UP (ref 11.5–15.5)
HGB BLD-MCNC: 12.2 G/DL — SIGNIFICANT CHANGE UP (ref 11.5–15.5)
HGB BLD-MCNC: 13 G/DL — SIGNIFICANT CHANGE UP (ref 11.5–15.5)
HGB BLD-MCNC: 13 G/DL — SIGNIFICANT CHANGE UP (ref 11.5–15.5)
HGB BLD-MCNC: 8.2 G/DL — LOW (ref 11.5–15.5)
HGB BLD-MCNC: 8.2 G/DL — LOW (ref 11.5–15.5)
HGB BLD-MCNC: 8.9 G/DL — LOW (ref 11.5–15.5)
HGB BLD-MCNC: 8.9 G/DL — LOW (ref 11.5–15.5)
HGB BLD-MCNC: 9.4 G/DL — LOW (ref 11.5–15.5)
HGB BLD-MCNC: 9.4 G/DL — LOW (ref 11.5–15.5)
HGB BLD-MCNC: 9.5 G/DL — LOW (ref 11.5–15.5)
HGB BLD-MCNC: 9.5 G/DL — LOW (ref 11.5–15.5)
HGB BLD-MCNC: 9.7 G/DL — LOW (ref 11.5–15.5)
HGB BLD-MCNC: 9.7 G/DL — LOW (ref 11.5–15.5)
HOROWITZ INDEX BLDA+IHG-RTO: 56 — SIGNIFICANT CHANGE UP
HOROWITZ INDEX BLDA+IHG-RTO: 56 — SIGNIFICANT CHANGE UP
IMM GRANULOCYTES NFR BLD AUTO: 0.3 % — SIGNIFICANT CHANGE UP (ref 0–0.9)
IMM GRANULOCYTES NFR BLD AUTO: 0.3 % — SIGNIFICANT CHANGE UP (ref 0–0.9)
IMM GRANULOCYTES NFR BLD AUTO: 0.4 % — SIGNIFICANT CHANGE UP (ref 0–0.9)
IMM GRANULOCYTES NFR BLD AUTO: 0.8 % — SIGNIFICANT CHANGE UP (ref 0–0.9)
IMM GRANULOCYTES NFR BLD AUTO: 0.8 % — SIGNIFICANT CHANGE UP (ref 0–0.9)
IMM GRANULOCYTES NFR BLD AUTO: 7.9 % — HIGH (ref 0–0.9)
IMM GRANULOCYTES NFR BLD AUTO: 7.9 % — HIGH (ref 0–0.9)
INR BLD: 1.05 RATIO — SIGNIFICANT CHANGE UP (ref 0.85–1.18)
INR BLD: 1.05 RATIO — SIGNIFICANT CHANGE UP (ref 0.85–1.18)
KETONES UR-MCNC: ABNORMAL MG/DL
KETONES UR-MCNC: ABNORMAL MG/DL
KETONES UR-MCNC: NEGATIVE — SIGNIFICANT CHANGE UP
LACTATE SERPL-SCNC: 1 MMOL/L — SIGNIFICANT CHANGE UP (ref 0.7–2)
LACTATE SERPL-SCNC: 1 MMOL/L — SIGNIFICANT CHANGE UP (ref 0.7–2)
LACTATE SERPL-SCNC: 1.6 MMOL/L — SIGNIFICANT CHANGE UP (ref 0.7–2)
LACTATE SERPL-SCNC: 1.6 MMOL/L — SIGNIFICANT CHANGE UP (ref 0.7–2)
LACTATE SERPL-SCNC: 3.8 MMOL/L — HIGH (ref 0.7–2)
LACTATE SERPL-SCNC: 3.8 MMOL/L — HIGH (ref 0.7–2)
LACTATE SERPL-SCNC: 3.9 MMOL/L — HIGH (ref 0.7–2)
LACTATE SERPL-SCNC: 3.9 MMOL/L — HIGH (ref 0.7–2)
LACTATE SERPL-SCNC: 4.2 MMOL/L — CRITICAL HIGH (ref 0.7–2)
LACTATE SERPL-SCNC: 4.2 MMOL/L — CRITICAL HIGH (ref 0.7–2)
LACTATE SERPL-SCNC: 4.8 MMOL/L — CRITICAL HIGH (ref 0.7–2)
LACTATE SERPL-SCNC: 4.8 MMOL/L — CRITICAL HIGH (ref 0.7–2)
LEGIONELLA AG UR QL: NEGATIVE — SIGNIFICANT CHANGE UP
LEGIONELLA AG UR QL: NEGATIVE — SIGNIFICANT CHANGE UP
LEUKOCYTE ESTERASE UR-ACNC: ABNORMAL
LEUKOCYTE ESTERASE UR-ACNC: NEGATIVE — SIGNIFICANT CHANGE UP
LEUKOCYTE ESTERASE UR-ACNC: NEGATIVE — SIGNIFICANT CHANGE UP
LIDOCAIN IGE QN: 13 U/L — LOW (ref 73–393)
LIPID PNL WITH DIRECT LDL SERPL: 100 MG/DL — HIGH
LYMPHOCYTES # BLD AUTO: 0.91 K/UL — LOW (ref 1–3.3)
LYMPHOCYTES # BLD AUTO: 0.91 K/UL — LOW (ref 1–3.3)
LYMPHOCYTES # BLD AUTO: 1.01 K/UL — SIGNIFICANT CHANGE UP (ref 1–3.3)
LYMPHOCYTES # BLD AUTO: 1.01 K/UL — SIGNIFICANT CHANGE UP (ref 1–3.3)
LYMPHOCYTES # BLD AUTO: 1.51 K/UL — SIGNIFICANT CHANGE UP (ref 1–3.3)
LYMPHOCYTES # BLD AUTO: 1.51 K/UL — SIGNIFICANT CHANGE UP (ref 1–3.3)
LYMPHOCYTES # BLD AUTO: 1.62 K/UL — SIGNIFICANT CHANGE UP (ref 1–3.3)
LYMPHOCYTES # BLD AUTO: 1.62 K/UL — SIGNIFICANT CHANGE UP (ref 1–3.3)
LYMPHOCYTES # BLD AUTO: 12.4 % — LOW (ref 13–44)
LYMPHOCYTES # BLD AUTO: 12.4 % — LOW (ref 13–44)
LYMPHOCYTES # BLD AUTO: 13 % — SIGNIFICANT CHANGE UP (ref 13–44)
LYMPHOCYTES # BLD AUTO: 13 % — SIGNIFICANT CHANGE UP (ref 13–44)
LYMPHOCYTES # BLD AUTO: 2.11 K/UL — SIGNIFICANT CHANGE UP (ref 1–3.3)
LYMPHOCYTES # BLD AUTO: 2.77 K/UL — SIGNIFICANT CHANGE UP (ref 1–3.3)
LYMPHOCYTES # BLD AUTO: 29.6 % — SIGNIFICANT CHANGE UP (ref 13–44)
LYMPHOCYTES # BLD AUTO: 3.02 K/UL — SIGNIFICANT CHANGE UP (ref 1–3.3)
LYMPHOCYTES # BLD AUTO: 36.8 % — SIGNIFICANT CHANGE UP (ref 13–44)
LYMPHOCYTES # BLD AUTO: 46.3 % — HIGH (ref 13–44)
LYMPHOCYTES # BLD AUTO: 7 % — LOW (ref 13–44)
LYMPHOCYTES # BLD AUTO: 7 % — LOW (ref 13–44)
LYMPHOCYTES # BLD AUTO: 8.7 % — LOW (ref 13–44)
LYMPHOCYTES # BLD AUTO: 8.7 % — LOW (ref 13–44)
MAGNESIUM SERPL-MCNC: 2.2 MG/DL — SIGNIFICANT CHANGE UP (ref 1.6–2.6)
MAGNESIUM SERPL-MCNC: 2.4 MG/DL — SIGNIFICANT CHANGE UP (ref 1.6–2.6)
MAGNESIUM SERPL-MCNC: 2.5 MG/DL — SIGNIFICANT CHANGE UP (ref 1.6–2.6)
MAGNESIUM SERPL-MCNC: 2.5 MG/DL — SIGNIFICANT CHANGE UP (ref 1.6–2.6)
MAGNESIUM SERPL-MCNC: 2.6 MG/DL — SIGNIFICANT CHANGE UP (ref 1.6–2.6)
MAGNESIUM SERPL-MCNC: 2.7 MG/DL — HIGH (ref 1.6–2.6)
MAGNESIUM SERPL-MCNC: 2.7 MG/DL — HIGH (ref 1.6–2.6)
MAGNESIUM SERPL-MCNC: 2.8 MG/DL — HIGH (ref 1.6–2.6)
MAGNESIUM SERPL-MCNC: 2.8 MG/DL — HIGH (ref 1.6–2.6)
MAGNESIUM SERPL-MCNC: 2.9 MG/DL — HIGH (ref 1.6–2.6)
MAGNESIUM SERPL-MCNC: 2.9 MG/DL — HIGH (ref 1.6–2.6)
MANUAL SMEAR VERIFICATION: SIGNIFICANT CHANGE UP
MANUAL SMEAR VERIFICATION: SIGNIFICANT CHANGE UP
MCHC RBC-ENTMCNC: 26.3 PG — LOW (ref 27–34)
MCHC RBC-ENTMCNC: 26.3 PG — LOW (ref 27–34)
MCHC RBC-ENTMCNC: 26.6 PG — LOW (ref 27–34)
MCHC RBC-ENTMCNC: 26.8 PG — LOW (ref 27–34)
MCHC RBC-ENTMCNC: 26.9 PG — LOW (ref 27–34)
MCHC RBC-ENTMCNC: 27.2 PG — SIGNIFICANT CHANGE UP (ref 27–34)
MCHC RBC-ENTMCNC: 27.2 PG — SIGNIFICANT CHANGE UP (ref 27–34)
MCHC RBC-ENTMCNC: 27.3 PG — SIGNIFICANT CHANGE UP (ref 27–34)
MCHC RBC-ENTMCNC: 27.7 PG — SIGNIFICANT CHANGE UP (ref 27–34)
MCHC RBC-ENTMCNC: 27.7 PG — SIGNIFICANT CHANGE UP (ref 27–34)
MCHC RBC-ENTMCNC: 31 G/DL — LOW (ref 32–36)
MCHC RBC-ENTMCNC: 31.3 G/DL — LOW (ref 32–36)
MCHC RBC-ENTMCNC: 31.3 G/DL — LOW (ref 32–36)
MCHC RBC-ENTMCNC: 31.6 G/DL — LOW (ref 32–36)
MCHC RBC-ENTMCNC: 31.7 G/DL — LOW (ref 32–36)
MCHC RBC-ENTMCNC: 31.7 G/DL — LOW (ref 32–36)
MCHC RBC-ENTMCNC: 31.8 G/DL — LOW (ref 32–36)
MCHC RBC-ENTMCNC: 32.1 G/DL — SIGNIFICANT CHANGE UP (ref 32–36)
MCHC RBC-ENTMCNC: 32.4 G/DL — SIGNIFICANT CHANGE UP (ref 32–36)
MCHC RBC-ENTMCNC: 32.4 G/DL — SIGNIFICANT CHANGE UP (ref 32–36)
MCHC RBC-ENTMCNC: 32.8 G/DL — SIGNIFICANT CHANGE UP (ref 32–36)
MCHC RBC-ENTMCNC: 32.8 G/DL — SIGNIFICANT CHANGE UP (ref 32–36)
MCHC RBC-ENTMCNC: 33 G/DL — SIGNIFICANT CHANGE UP (ref 32–36)
MCHC RBC-ENTMCNC: 33 G/DL — SIGNIFICANT CHANGE UP (ref 32–36)
MCHC RBC-ENTMCNC: 34.1 G/DL — SIGNIFICANT CHANGE UP (ref 32–36)
MCHC RBC-ENTMCNC: 34.1 G/DL — SIGNIFICANT CHANGE UP (ref 32–36)
MCHC RBC-ENTMCNC: 34.4 G/DL — SIGNIFICANT CHANGE UP (ref 32–36)
MCHC RBC-ENTMCNC: 34.4 G/DL — SIGNIFICANT CHANGE UP (ref 32–36)
MCHC RBC-ENTMCNC: 34.9 G/DL — SIGNIFICANT CHANGE UP (ref 32–36)
MCHC RBC-ENTMCNC: 34.9 G/DL — SIGNIFICANT CHANGE UP (ref 32–36)
MCV RBC AUTO: 78.3 FL — LOW (ref 80–100)
MCV RBC AUTO: 78.3 FL — LOW (ref 80–100)
MCV RBC AUTO: 80.2 FL — SIGNIFICANT CHANGE UP (ref 80–100)
MCV RBC AUTO: 80.2 FL — SIGNIFICANT CHANGE UP (ref 80–100)
MCV RBC AUTO: 80.7 FL — SIGNIFICANT CHANGE UP (ref 80–100)
MCV RBC AUTO: 80.7 FL — SIGNIFICANT CHANGE UP (ref 80–100)
MCV RBC AUTO: 82 FL — SIGNIFICANT CHANGE UP (ref 80–100)
MCV RBC AUTO: 82 FL — SIGNIFICANT CHANGE UP (ref 80–100)
MCV RBC AUTO: 82.2 FL — SIGNIFICANT CHANGE UP (ref 80–100)
MCV RBC AUTO: 82.2 FL — SIGNIFICANT CHANGE UP (ref 80–100)
MCV RBC AUTO: 82.6 FL — SIGNIFICANT CHANGE UP (ref 80–100)
MCV RBC AUTO: 82.6 FL — SIGNIFICANT CHANGE UP (ref 80–100)
MCV RBC AUTO: 83.4 FL — SIGNIFICANT CHANGE UP (ref 80–100)
MCV RBC AUTO: 83.7 FL — SIGNIFICANT CHANGE UP (ref 80–100)
MCV RBC AUTO: 83.9 FL — SIGNIFICANT CHANGE UP (ref 80–100)
MCV RBC AUTO: 83.9 FL — SIGNIFICANT CHANGE UP (ref 80–100)
MCV RBC AUTO: 84.1 FL — SIGNIFICANT CHANGE UP (ref 80–100)
MCV RBC AUTO: 84.1 FL — SIGNIFICANT CHANGE UP (ref 80–100)
MCV RBC AUTO: 85.2 FL — SIGNIFICANT CHANGE UP (ref 80–100)
MCV RBC AUTO: 86.7 FL — SIGNIFICANT CHANGE UP (ref 80–100)
MONOCYTES # BLD AUTO: 0.72 K/UL — SIGNIFICANT CHANGE UP (ref 0–0.9)
MONOCYTES # BLD AUTO: 0.72 K/UL — SIGNIFICANT CHANGE UP (ref 0–0.9)
MONOCYTES # BLD AUTO: 0.84 K/UL — SIGNIFICANT CHANGE UP (ref 0–0.9)
MONOCYTES # BLD AUTO: 0.91 K/UL — HIGH (ref 0–0.9)
MONOCYTES # BLD AUTO: 0.91 K/UL — HIGH (ref 0–0.9)
MONOCYTES # BLD AUTO: 0.99 K/UL — HIGH (ref 0–0.9)
MONOCYTES # BLD AUTO: 0.99 K/UL — HIGH (ref 0–0.9)
MONOCYTES # BLD AUTO: 1.04 K/UL — HIGH (ref 0–0.9)
MONOCYTES # BLD AUTO: 1.04 K/UL — HIGH (ref 0–0.9)
MONOCYTES # BLD AUTO: 1.63 K/UL — HIGH (ref 0–0.9)
MONOCYTES # BLD AUTO: 1.63 K/UL — HIGH (ref 0–0.9)
MONOCYTES NFR BLD AUTO: 10.1 % — SIGNIFICANT CHANGE UP (ref 2–14)
MONOCYTES NFR BLD AUTO: 11 % — SIGNIFICANT CHANGE UP (ref 2–14)
MONOCYTES NFR BLD AUTO: 11.2 % — SIGNIFICANT CHANGE UP (ref 2–14)
MONOCYTES NFR BLD AUTO: 14 % — SIGNIFICANT CHANGE UP (ref 2–14)
MONOCYTES NFR BLD AUTO: 14 % — SIGNIFICANT CHANGE UP (ref 2–14)
MONOCYTES NFR BLD AUTO: 7 % — SIGNIFICANT CHANGE UP (ref 2–14)
MONOCYTES NFR BLD AUTO: 7 % — SIGNIFICANT CHANGE UP (ref 2–14)
MONOCYTES NFR BLD AUTO: 7.9 % — SIGNIFICANT CHANGE UP (ref 2–14)
MONOCYTES NFR BLD AUTO: 7.9 % — SIGNIFICANT CHANGE UP (ref 2–14)
MONOCYTES NFR BLD AUTO: 8.6 % — SIGNIFICANT CHANGE UP (ref 2–14)
MONOCYTES NFR BLD AUTO: 8.6 % — SIGNIFICANT CHANGE UP (ref 2–14)
MRSA PCR RESULT.: SIGNIFICANT CHANGE UP
MRSA PCR RESULT.: SIGNIFICANT CHANGE UP
NEUTROPHILS # BLD AUTO: 10.99 K/UL — HIGH (ref 1.8–7.4)
NEUTROPHILS # BLD AUTO: 10.99 K/UL — HIGH (ref 1.8–7.4)
NEUTROPHILS # BLD AUTO: 2.43 K/UL — SIGNIFICANT CHANGE UP (ref 1.8–7.4)
NEUTROPHILS # BLD AUTO: 3.44 K/UL — SIGNIFICANT CHANGE UP (ref 1.8–7.4)
NEUTROPHILS # BLD AUTO: 4.08 K/UL — SIGNIFICANT CHANGE UP (ref 1.8–7.4)
NEUTROPHILS # BLD AUTO: 8.5 K/UL — HIGH (ref 1.8–7.4)
NEUTROPHILS # BLD AUTO: 8.5 K/UL — HIGH (ref 1.8–7.4)
NEUTROPHILS # BLD AUTO: 9.32 K/UL — HIGH (ref 1.8–7.4)
NEUTROPHILS # BLD AUTO: 9.32 K/UL — HIGH (ref 1.8–7.4)
NEUTROPHILS # BLD AUTO: 9.44 K/UL — HIGH (ref 1.8–7.4)
NEUTROPHILS # BLD AUTO: 9.44 K/UL — HIGH (ref 1.8–7.4)
NEUTROPHILS NFR BLD AUTO: 37.4 % — LOW (ref 43–77)
NEUTROPHILS NFR BLD AUTO: 45.6 % — SIGNIFICANT CHANGE UP (ref 43–77)
NEUTROPHILS NFR BLD AUTO: 57.1 % — SIGNIFICANT CHANGE UP (ref 43–77)
NEUTROPHILS NFR BLD AUTO: 60 % — SIGNIFICANT CHANGE UP (ref 43–77)
NEUTROPHILS NFR BLD AUTO: 60 % — SIGNIFICANT CHANGE UP (ref 43–77)
NEUTROPHILS NFR BLD AUTO: 71 % — SIGNIFICANT CHANGE UP (ref 43–77)
NEUTROPHILS NFR BLD AUTO: 71 % — SIGNIFICANT CHANGE UP (ref 43–77)
NEUTROPHILS NFR BLD AUTO: 80 % — HIGH (ref 43–77)
NEUTROPHILS NFR BLD AUTO: 80 % — HIGH (ref 43–77)
NEUTROPHILS NFR BLD AUTO: 81.6 % — HIGH (ref 43–77)
NEUTROPHILS NFR BLD AUTO: 81.6 % — HIGH (ref 43–77)
NEUTS BAND # BLD: 13 % — HIGH (ref 0–8)
NEUTS BAND # BLD: 13 % — HIGH (ref 0–8)
NEUTS BAND # BLD: 5 % — SIGNIFICANT CHANGE UP (ref 0–8)
NEUTS BAND # BLD: 5 % — SIGNIFICANT CHANGE UP (ref 0–8)
NITRITE UR-MCNC: NEGATIVE — SIGNIFICANT CHANGE UP
NON HDL CHOLESTEROL: 124 MG/DL — SIGNIFICANT CHANGE UP
NRBC # BLD: 0 /100 WBCS — SIGNIFICANT CHANGE UP (ref 0–0)
NRBC # BLD: 0 /100 — SIGNIFICANT CHANGE UP (ref 0–0)
NRBC # BLD: SIGNIFICANT CHANGE UP /100 WBCS (ref 0–0)
NT-PROBNP SERPL-SCNC: 1032 PG/ML — HIGH (ref 0–450)
NT-PROBNP SERPL-SCNC: HIGH PG/ML (ref 0–450)
NT-PROBNP SERPL-SCNC: HIGH PG/ML (ref 0–450)
PCO2 BLDA: 44 MMHG — SIGNIFICANT CHANGE UP (ref 32–46)
PCO2 BLDA: 44 MMHG — SIGNIFICANT CHANGE UP (ref 32–46)
PCO2 BLDV: 46 MMHG — SIGNIFICANT CHANGE UP (ref 42–55)
PCO2 BLDV: 46 MMHG — SIGNIFICANT CHANGE UP (ref 42–55)
PH BLDA: 7.27 — LOW (ref 7.35–7.45)
PH BLDA: 7.27 — LOW (ref 7.35–7.45)
PH BLDV: 7.27 — LOW (ref 7.32–7.43)
PH BLDV: 7.27 — LOW (ref 7.32–7.43)
PH UR: 6.5 — SIGNIFICANT CHANGE UP (ref 5–8)
PH UR: 6.5 — SIGNIFICANT CHANGE UP (ref 5–8)
PH UR: 7 — SIGNIFICANT CHANGE UP (ref 5–8)
PHOSPHATE SERPL-MCNC: 3.7 MG/DL — SIGNIFICANT CHANGE UP (ref 2.5–4.5)
PHOSPHATE SERPL-MCNC: 4.4 MG/DL — SIGNIFICANT CHANGE UP (ref 2.5–4.5)
PHOSPHATE SERPL-MCNC: 5.8 MG/DL — HIGH (ref 2.5–4.5)
PHOSPHATE SERPL-MCNC: 5.9 MG/DL — HIGH (ref 2.5–4.5)
PHOSPHATE SERPL-MCNC: 5.9 MG/DL — HIGH (ref 2.5–4.5)
PHOSPHATE SERPL-MCNC: 6.1 MG/DL — HIGH (ref 2.5–4.5)
PHOSPHATE SERPL-MCNC: 6.1 MG/DL — HIGH (ref 2.5–4.5)
PHOSPHATE SERPL-MCNC: 6.2 MG/DL — HIGH (ref 2.5–4.5)
PHOSPHATE SERPL-MCNC: 6.2 MG/DL — HIGH (ref 2.5–4.5)
PHOSPHATE SERPL-MCNC: 6.3 MG/DL — HIGH (ref 2.5–4.5)
PHOSPHATE SERPL-MCNC: 6.3 MG/DL — HIGH (ref 2.5–4.5)
PHOSPHATE SERPL-MCNC: 7.1 MG/DL — HIGH (ref 2.5–4.5)
PLAT MORPH BLD: NORMAL — SIGNIFICANT CHANGE UP
PLATELET # BLD AUTO: 143 K/UL — LOW (ref 150–400)
PLATELET # BLD AUTO: 143 K/UL — LOW (ref 150–400)
PLATELET # BLD AUTO: 144 K/UL — LOW (ref 150–400)
PLATELET # BLD AUTO: 144 K/UL — LOW (ref 150–400)
PLATELET # BLD AUTO: 145 K/UL — LOW (ref 150–400)
PLATELET # BLD AUTO: 145 K/UL — LOW (ref 150–400)
PLATELET # BLD AUTO: 150 K/UL — SIGNIFICANT CHANGE UP (ref 150–400)
PLATELET # BLD AUTO: 150 K/UL — SIGNIFICANT CHANGE UP (ref 150–400)
PLATELET # BLD AUTO: 157 K/UL — SIGNIFICANT CHANGE UP (ref 150–400)
PLATELET # BLD AUTO: 157 K/UL — SIGNIFICANT CHANGE UP (ref 150–400)
PLATELET # BLD AUTO: 158 K/UL — SIGNIFICANT CHANGE UP (ref 150–400)
PLATELET # BLD AUTO: 158 K/UL — SIGNIFICANT CHANGE UP (ref 150–400)
PLATELET # BLD AUTO: 174 K/UL — SIGNIFICANT CHANGE UP (ref 150–400)
PLATELET # BLD AUTO: 174 K/UL — SIGNIFICANT CHANGE UP (ref 150–400)
PLATELET # BLD AUTO: 176 K/UL — SIGNIFICANT CHANGE UP (ref 150–400)
PLATELET # BLD AUTO: 176 K/UL — SIGNIFICANT CHANGE UP (ref 150–400)
PLATELET # BLD AUTO: 185 K/UL — SIGNIFICANT CHANGE UP (ref 150–400)
PLATELET # BLD AUTO: 190 K/UL — SIGNIFICANT CHANGE UP (ref 150–400)
PLATELET # BLD AUTO: 198 K/UL — SIGNIFICANT CHANGE UP (ref 150–400)
PLATELET # BLD AUTO: 199 K/UL — SIGNIFICANT CHANGE UP (ref 150–400)
PO2 BLDA: 70 MMHG — LOW (ref 83–108)
PO2 BLDA: 70 MMHG — LOW (ref 83–108)
PO2 BLDV: 47 MMHG — HIGH (ref 25–45)
PO2 BLDV: 47 MMHG — HIGH (ref 25–45)
POIKILOCYTOSIS BLD QL AUTO: SLIGHT — SIGNIFICANT CHANGE UP
POIKILOCYTOSIS BLD QL AUTO: SLIGHT — SIGNIFICANT CHANGE UP
POTASSIUM SERPL-MCNC: 3.6 MMOL/L — SIGNIFICANT CHANGE UP (ref 3.5–5.3)
POTASSIUM SERPL-MCNC: 3.6 MMOL/L — SIGNIFICANT CHANGE UP (ref 3.5–5.3)
POTASSIUM SERPL-MCNC: 3.7 MMOL/L — SIGNIFICANT CHANGE UP (ref 3.5–5.3)
POTASSIUM SERPL-MCNC: 3.8 MMOL/L — SIGNIFICANT CHANGE UP (ref 3.5–5.3)
POTASSIUM SERPL-MCNC: 4 MMOL/L — SIGNIFICANT CHANGE UP (ref 3.5–5.3)
POTASSIUM SERPL-MCNC: 4 MMOL/L — SIGNIFICANT CHANGE UP (ref 3.5–5.3)
POTASSIUM SERPL-MCNC: 4.1 MMOL/L — SIGNIFICANT CHANGE UP (ref 3.5–5.3)
POTASSIUM SERPL-MCNC: 4.2 MMOL/L — SIGNIFICANT CHANGE UP (ref 3.5–5.3)
POTASSIUM SERPL-MCNC: 4.3 MMOL/L — SIGNIFICANT CHANGE UP (ref 3.5–5.3)
POTASSIUM SERPL-MCNC: 4.3 MMOL/L — SIGNIFICANT CHANGE UP (ref 3.5–5.3)
POTASSIUM SERPL-MCNC: 4.4 MMOL/L — SIGNIFICANT CHANGE UP (ref 3.5–5.3)
POTASSIUM SERPL-MCNC: 4.4 MMOL/L — SIGNIFICANT CHANGE UP (ref 3.5–5.3)
POTASSIUM SERPL-SCNC: 3.6 MMOL/L — SIGNIFICANT CHANGE UP (ref 3.5–5.3)
POTASSIUM SERPL-SCNC: 3.6 MMOL/L — SIGNIFICANT CHANGE UP (ref 3.5–5.3)
POTASSIUM SERPL-SCNC: 3.7 MMOL/L — SIGNIFICANT CHANGE UP (ref 3.5–5.3)
POTASSIUM SERPL-SCNC: 3.8 MMOL/L — SIGNIFICANT CHANGE UP (ref 3.5–5.3)
POTASSIUM SERPL-SCNC: 4 MMOL/L — SIGNIFICANT CHANGE UP (ref 3.5–5.3)
POTASSIUM SERPL-SCNC: 4 MMOL/L — SIGNIFICANT CHANGE UP (ref 3.5–5.3)
POTASSIUM SERPL-SCNC: 4.1 MMOL/L — SIGNIFICANT CHANGE UP (ref 3.5–5.3)
POTASSIUM SERPL-SCNC: 4.2 MMOL/L — SIGNIFICANT CHANGE UP (ref 3.5–5.3)
POTASSIUM SERPL-SCNC: 4.3 MMOL/L — SIGNIFICANT CHANGE UP (ref 3.5–5.3)
POTASSIUM SERPL-SCNC: 4.3 MMOL/L — SIGNIFICANT CHANGE UP (ref 3.5–5.3)
POTASSIUM SERPL-SCNC: 4.4 MMOL/L — SIGNIFICANT CHANGE UP (ref 3.5–5.3)
POTASSIUM SERPL-SCNC: 4.4 MMOL/L — SIGNIFICANT CHANGE UP (ref 3.5–5.3)
PROCALCITONIN SERPL-MCNC: 5.16 NG/ML — HIGH (ref 0.02–0.1)
PROCALCITONIN SERPL-MCNC: 5.16 NG/ML — HIGH (ref 0.02–0.1)
PROT SERPL-MCNC: 5.5 GM/DL — LOW (ref 6–8.3)
PROT SERPL-MCNC: 5.5 GM/DL — LOW (ref 6–8.3)
PROT SERPL-MCNC: 6 GM/DL — SIGNIFICANT CHANGE UP (ref 6–8.3)
PROT SERPL-MCNC: 6 GM/DL — SIGNIFICANT CHANGE UP (ref 6–8.3)
PROT SERPL-MCNC: 6.1 GM/DL — SIGNIFICANT CHANGE UP (ref 6–8.3)
PROT SERPL-MCNC: 6.2 GM/DL — SIGNIFICANT CHANGE UP (ref 6–8.3)
PROT SERPL-MCNC: 6.8 GM/DL — SIGNIFICANT CHANGE UP (ref 6–8.3)
PROT SERPL-MCNC: 7 GM/DL — SIGNIFICANT CHANGE UP (ref 6–8.3)
PROT SERPL-MCNC: 7 GM/DL — SIGNIFICANT CHANGE UP (ref 6–8.3)
PROT SERPL-MCNC: 7.5 GM/DL — SIGNIFICANT CHANGE UP (ref 6–8.3)
PROT SERPL-MCNC: 8.2 GM/DL — SIGNIFICANT CHANGE UP (ref 6–8.3)
PROT SERPL-MCNC: 8.2 GM/DL — SIGNIFICANT CHANGE UP (ref 6–8.3)
PROT UR-MCNC: 100 MG/DL
PROT UR-MCNC: >=1000 MG/DL
PROT UR-MCNC: >=1000 MG/DL
PROTHROM AB SERPL-ACNC: 12.5 SEC — SIGNIFICANT CHANGE UP (ref 9.5–13)
PROTHROM AB SERPL-ACNC: 12.5 SEC — SIGNIFICANT CHANGE UP (ref 9.5–13)
RAPID RVP RESULT: DETECTED
RAPID RVP RESULT: DETECTED
RBC # BLD: 3 M/UL — LOW (ref 3.8–5.2)
RBC # BLD: 3 M/UL — LOW (ref 3.8–5.2)
RBC # BLD: 3.21 M/UL — LOW (ref 3.8–5.2)
RBC # BLD: 3.21 M/UL — LOW (ref 3.8–5.2)
RBC # BLD: 3.44 M/UL — LOW (ref 3.8–5.2)
RBC # BLD: 3.44 M/UL — LOW (ref 3.8–5.2)
RBC # BLD: 3.53 M/UL — LOW (ref 3.8–5.2)
RBC # BLD: 3.53 M/UL — LOW (ref 3.8–5.2)
RBC # BLD: 3.56 M/UL — LOW (ref 3.8–5.2)
RBC # BLD: 3.56 M/UL — LOW (ref 3.8–5.2)
RBC # BLD: 3.98 M/UL — SIGNIFICANT CHANGE UP (ref 3.8–5.2)
RBC # BLD: 3.98 M/UL — SIGNIFICANT CHANGE UP (ref 3.8–5.2)
RBC # BLD: 4.1 M/UL — SIGNIFICANT CHANGE UP (ref 3.8–5.2)
RBC # BLD: 4.29 M/UL — SIGNIFICANT CHANGE UP (ref 3.8–5.2)
RBC # BLD: 4.53 M/UL — SIGNIFICANT CHANGE UP (ref 3.8–5.2)
RBC # BLD: 4.89 M/UL — SIGNIFICANT CHANGE UP (ref 3.8–5.2)
RBC # BLD: 4.89 M/UL — SIGNIFICANT CHANGE UP (ref 3.8–5.2)
RBC # FLD: 14.3 % — SIGNIFICANT CHANGE UP (ref 10.3–14.5)
RBC # FLD: 14.3 % — SIGNIFICANT CHANGE UP (ref 10.3–14.5)
RBC # FLD: 14.7 % — HIGH (ref 10.3–14.5)
RBC # FLD: 14.8 % — HIGH (ref 10.3–14.5)
RBC # FLD: 14.9 % — HIGH (ref 10.3–14.5)
RBC # FLD: 15 % — HIGH (ref 10.3–14.5)
RBC BLD AUTO: ABNORMAL
RBC BLD AUTO: ABNORMAL
RBC BLD AUTO: NORMAL — SIGNIFICANT CHANGE UP
RBC BLD AUTO: NORMAL — SIGNIFICANT CHANGE UP
RBC CASTS # UR COMP ASSIST: ABNORMAL /HPF (ref 0–4)
RBC CASTS # UR COMP ASSIST: SIGNIFICANT CHANGE UP /HPF (ref 0–4)
RBC CASTS # UR COMP ASSIST: SIGNIFICANT CHANGE UP /HPF (ref 0–4)
RSV RNA SPEC QL NAA+PROBE: DETECTED
RSV RNA SPEC QL NAA+PROBE: DETECTED
S AUREUS DNA NOSE QL NAA+PROBE: SIGNIFICANT CHANGE UP
S AUREUS DNA NOSE QL NAA+PROBE: SIGNIFICANT CHANGE UP
S PNEUM AG UR QL: NEGATIVE — SIGNIFICANT CHANGE UP
S PNEUM AG UR QL: NEGATIVE — SIGNIFICANT CHANGE UP
SAO2 % BLDA: 92.9 % — LOW (ref 94–98)
SAO2 % BLDA: 92.9 % — LOW (ref 94–98)
SAO2 % BLDV: 69.3 % — LOW (ref 94–98)
SAO2 % BLDV: 69.3 % — LOW (ref 94–98)
SARS-COV-2 RNA SPEC QL NAA+PROBE: SIGNIFICANT CHANGE UP
SODIUM SERPL-SCNC: 131 MMOL/L — LOW (ref 135–145)
SODIUM SERPL-SCNC: 132 MMOL/L — LOW (ref 135–145)
SODIUM SERPL-SCNC: 133 MMOL/L — LOW (ref 135–145)
SODIUM SERPL-SCNC: 133 MMOL/L — LOW (ref 135–145)
SODIUM SERPL-SCNC: 134 MMOL/L — LOW (ref 135–145)
SODIUM SERPL-SCNC: 134 MMOL/L — LOW (ref 135–145)
SODIUM SERPL-SCNC: 136 MMOL/L — SIGNIFICANT CHANGE UP (ref 135–145)
SODIUM SERPL-SCNC: 136 MMOL/L — SIGNIFICANT CHANGE UP (ref 135–145)
SODIUM SERPL-SCNC: 138 MMOL/L — SIGNIFICANT CHANGE UP (ref 135–145)
SODIUM SERPL-SCNC: 138 MMOL/L — SIGNIFICANT CHANGE UP (ref 135–145)
SP GR SPEC: 1 — LOW (ref 1.01–1.02)
SP GR SPEC: >1.03 — HIGH (ref 1–1.03)
SP GR SPEC: >1.03 — HIGH (ref 1–1.03)
SPECIMEN SOURCE: SIGNIFICANT CHANGE UP
TRIGL SERPL-MCNC: 120 MG/DL — SIGNIFICANT CHANGE UP
TROPONIN I, HIGH SENSITIVITY RESULT: 103.6 NG/L — HIGH
TROPONIN I, HIGH SENSITIVITY RESULT: 103.6 NG/L — HIGH
TROPONIN I, HIGH SENSITIVITY RESULT: 235.1 NG/L — HIGH
TROPONIN I, HIGH SENSITIVITY RESULT: 235.1 NG/L — HIGH
TROPONIN I, HIGH SENSITIVITY RESULT: 241.6 NG/L — HIGH
TROPONIN I, HIGH SENSITIVITY RESULT: 241.6 NG/L — HIGH
TROPONIN I, HIGH SENSITIVITY RESULT: 270.8 NG/L — HIGH
TROPONIN I, HIGH SENSITIVITY RESULT: 270.8 NG/L — HIGH
TROPONIN I, HIGH SENSITIVITY RESULT: 273.2 NG/L — HIGH
TROPONIN I, HIGH SENSITIVITY RESULT: 273.2 NG/L — HIGH
TROPONIN I, HIGH SENSITIVITY RESULT: 50 NG/L — SIGNIFICANT CHANGE UP
TROPONIN I, HIGH SENSITIVITY RESULT: 64.6 NG/L — HIGH
TSH SERPL-MCNC: 0.73 UU/ML — SIGNIFICANT CHANGE UP (ref 0.36–3.74)
TSH SERPL-MCNC: 0.73 UU/ML — SIGNIFICANT CHANGE UP (ref 0.36–3.74)
TSH SERPL-MCNC: 1.71 UIU/ML — SIGNIFICANT CHANGE UP (ref 0.36–3.74)
UROBILINOGEN FLD QL: 1 MG/DL — SIGNIFICANT CHANGE UP (ref 0.2–1)
UROBILINOGEN FLD QL: 1 MG/DL — SIGNIFICANT CHANGE UP (ref 0.2–1)
UROBILINOGEN FLD QL: NEGATIVE MG/DL — SIGNIFICANT CHANGE UP
WBC # BLD: 11.05 K/UL — HIGH (ref 3.8–10.5)
WBC # BLD: 11.05 K/UL — HIGH (ref 3.8–10.5)
WBC # BLD: 11.41 K/UL — HIGH (ref 3.8–10.5)
WBC # BLD: 11.41 K/UL — HIGH (ref 3.8–10.5)
WBC # BLD: 11.57 K/UL — HIGH (ref 3.8–10.5)
WBC # BLD: 11.57 K/UL — HIGH (ref 3.8–10.5)
WBC # BLD: 11.65 K/UL — HIGH (ref 3.8–10.5)
WBC # BLD: 11.65 K/UL — HIGH (ref 3.8–10.5)
WBC # BLD: 11.68 K/UL — HIGH (ref 3.8–10.5)
WBC # BLD: 11.68 K/UL — HIGH (ref 3.8–10.5)
WBC # BLD: 12.93 K/UL — HIGH (ref 3.8–10.5)
WBC # BLD: 12.93 K/UL — HIGH (ref 3.8–10.5)
WBC # BLD: 13.11 K/UL — HIGH (ref 3.8–10.5)
WBC # BLD: 13.11 K/UL — HIGH (ref 3.8–10.5)
WBC # BLD: 6.52 K/UL — SIGNIFICANT CHANGE UP (ref 3.8–10.5)
WBC # BLD: 7.14 K/UL — SIGNIFICANT CHANGE UP (ref 3.8–10.5)
WBC # BLD: 7.53 K/UL — SIGNIFICANT CHANGE UP (ref 3.8–10.5)
WBC # BLD: 7.62 K/UL — SIGNIFICANT CHANGE UP (ref 3.8–10.5)
WBC # BLD: 9.46 K/UL — SIGNIFICANT CHANGE UP (ref 3.8–10.5)
WBC # BLD: 9.46 K/UL — SIGNIFICANT CHANGE UP (ref 3.8–10.5)
WBC # FLD AUTO: 11.05 K/UL — HIGH (ref 3.8–10.5)
WBC # FLD AUTO: 11.05 K/UL — HIGH (ref 3.8–10.5)
WBC # FLD AUTO: 11.41 K/UL — HIGH (ref 3.8–10.5)
WBC # FLD AUTO: 11.41 K/UL — HIGH (ref 3.8–10.5)
WBC # FLD AUTO: 11.57 K/UL — HIGH (ref 3.8–10.5)
WBC # FLD AUTO: 11.57 K/UL — HIGH (ref 3.8–10.5)
WBC # FLD AUTO: 11.65 K/UL — HIGH (ref 3.8–10.5)
WBC # FLD AUTO: 11.65 K/UL — HIGH (ref 3.8–10.5)
WBC # FLD AUTO: 11.68 K/UL — HIGH (ref 3.8–10.5)
WBC # FLD AUTO: 11.68 K/UL — HIGH (ref 3.8–10.5)
WBC # FLD AUTO: 12.93 K/UL — HIGH (ref 3.8–10.5)
WBC # FLD AUTO: 12.93 K/UL — HIGH (ref 3.8–10.5)
WBC # FLD AUTO: 13.11 K/UL — HIGH (ref 3.8–10.5)
WBC # FLD AUTO: 13.11 K/UL — HIGH (ref 3.8–10.5)
WBC # FLD AUTO: 6.52 K/UL — SIGNIFICANT CHANGE UP (ref 3.8–10.5)
WBC # FLD AUTO: 7.14 K/UL — SIGNIFICANT CHANGE UP (ref 3.8–10.5)
WBC # FLD AUTO: 7.53 K/UL — SIGNIFICANT CHANGE UP (ref 3.8–10.5)
WBC # FLD AUTO: 7.62 K/UL — SIGNIFICANT CHANGE UP (ref 3.8–10.5)
WBC # FLD AUTO: 9.46 K/UL — SIGNIFICANT CHANGE UP (ref 3.8–10.5)
WBC # FLD AUTO: 9.46 K/UL — SIGNIFICANT CHANGE UP (ref 3.8–10.5)
WBC UR QL: ABNORMAL
WBC UR QL: SIGNIFICANT CHANGE UP /HPF (ref 0–5)
WBC UR QL: SIGNIFICANT CHANGE UP /HPF (ref 0–5)

## 2023-01-01 PROCEDURE — 99291 CRITICAL CARE FIRST HOUR: CPT | Mod: 25

## 2023-01-01 PROCEDURE — 93010 ELECTROCARDIOGRAM REPORT: CPT

## 2023-01-01 PROCEDURE — 99221 1ST HOSP IP/OBS SF/LOW 40: CPT | Mod: 25

## 2023-01-01 PROCEDURE — 71045 X-RAY EXAM CHEST 1 VIEW: CPT | Mod: 26

## 2023-01-01 PROCEDURE — 92012 INTRM OPH EXAM EST PATIENT: CPT

## 2023-01-01 PROCEDURE — 36680 INSERT NEEDLE BONE CAVITY: CPT

## 2023-01-01 PROCEDURE — 92286 ANT SGM IMG I&R SPECLR MIC: CPT

## 2023-01-01 PROCEDURE — 76604 US EXAM CHEST: CPT | Mod: 26

## 2023-01-01 PROCEDURE — 99285 EMERGENCY DEPT VISIT HI MDM: CPT

## 2023-01-01 PROCEDURE — 99291 CRITICAL CARE FIRST HOUR: CPT

## 2023-01-01 PROCEDURE — 70450 CT HEAD/BRAIN W/O DYE: CPT | Mod: 26

## 2023-01-01 PROCEDURE — 92134 CPTRZ OPH DX IMG PST SGM RTA: CPT

## 2023-01-01 PROCEDURE — 95816 EEG AWAKE AND DROWSY: CPT | Mod: 26

## 2023-01-01 PROCEDURE — 93306 TTE W/DOPPLER COMPLETE: CPT | Mod: 26

## 2023-01-01 PROCEDURE — 99232 SBSQ HOSP IP/OBS MODERATE 35: CPT

## 2023-01-01 PROCEDURE — 99222 1ST HOSP IP/OBS MODERATE 55: CPT

## 2023-01-01 PROCEDURE — 99292 CRITICAL CARE ADDL 30 MIN: CPT | Mod: 25

## 2023-01-01 PROCEDURE — 99239 HOSP IP/OBS DSCHRG MGMT >30: CPT

## 2023-01-01 PROCEDURE — 36000 PLACE NEEDLE IN VEIN: CPT

## 2023-01-01 PROCEDURE — 67028 INJECTION EYE DRUG: CPT | Mod: LT

## 2023-01-01 PROCEDURE — 51702 INSERT TEMP BLADDER CATH: CPT

## 2023-01-01 PROCEDURE — 36620 INSERTION CATHETER ARTERY: CPT

## 2023-01-01 PROCEDURE — 92014 COMPRE OPH EXAM EST PT 1/>: CPT | Mod: 25

## 2023-01-01 PROCEDURE — 93308 TTE F-UP OR LMTD: CPT | Mod: 26

## 2023-01-01 PROCEDURE — 71275 CT ANGIOGRAPHY CHEST: CPT | Mod: 26,MC

## 2023-01-01 RX ORDER — PHENAZOPYRIDINE HCL 100 MG
2 TABLET ORAL
Qty: 12 | Refills: 0
Start: 2023-01-01 | End: 2023-01-01

## 2023-01-01 RX ORDER — DOBUTAMINE HCL 250MG/20ML
2.5 VIAL (ML) INTRAVENOUS
Qty: 500 | Refills: 0 | Status: DISCONTINUED | OUTPATIENT
Start: 2023-01-01 | End: 2023-01-01

## 2023-01-01 RX ORDER — SODIUM CHLORIDE 9 MG/ML
1000 INJECTION, SOLUTION INTRAVENOUS
Refills: 0 | Status: DISCONTINUED | OUTPATIENT
Start: 2023-01-01 | End: 2023-01-01

## 2023-01-01 RX ORDER — VALSARTAN 80 MG/1
320 TABLET ORAL DAILY
Refills: 0 | Status: DISCONTINUED | OUTPATIENT
Start: 2023-01-01 | End: 2023-01-01

## 2023-01-01 RX ORDER — FENTANYL CITRATE 50 UG/ML
50 INJECTION INTRAVENOUS
Refills: 0 | Status: DISCONTINUED | OUTPATIENT
Start: 2023-01-01 | End: 2023-01-01

## 2023-01-01 RX ORDER — IPRATROPIUM/ALBUTEROL SULFATE 18-103MCG
3 AEROSOL WITH ADAPTER (GRAM) INHALATION
Refills: 0 | Status: COMPLETED | OUTPATIENT
Start: 2023-01-01 | End: 2023-01-01

## 2023-01-01 RX ORDER — INSULIN LISPRO 100/ML
VIAL (ML) SUBCUTANEOUS
Refills: 0 | Status: DISCONTINUED | OUTPATIENT
Start: 2023-01-01 | End: 2023-01-01

## 2023-01-01 RX ORDER — AZITHROMYCIN 500 MG/1
500 TABLET, FILM COATED ORAL EVERY 24 HOURS
Refills: 0 | Status: DISCONTINUED | OUTPATIENT
Start: 2023-01-01 | End: 2023-01-01

## 2023-01-01 RX ORDER — METOPROLOL TARTRATE 50 MG
1 TABLET ORAL
Qty: 0 | Refills: 0 | DISCHARGE

## 2023-01-01 RX ORDER — LABETALOL HCL 100 MG
100 TABLET ORAL THREE TIMES A DAY
Refills: 0 | Status: DISCONTINUED | OUTPATIENT
Start: 2023-01-01 | End: 2023-01-01

## 2023-01-01 RX ORDER — SODIUM CHLORIDE 9 MG/ML
1000 INJECTION, SOLUTION INTRAVENOUS ONCE
Refills: 0 | Status: COMPLETED | OUTPATIENT
Start: 2023-01-01 | End: 2023-01-01

## 2023-01-01 RX ORDER — INSULIN GLARGINE 100 [IU]/ML
15 INJECTION, SOLUTION SUBCUTANEOUS AT BEDTIME
Refills: 0 | Status: DISCONTINUED | OUTPATIENT
Start: 2023-01-01 | End: 2023-01-01

## 2023-01-01 RX ORDER — CEFTRIAXONE 500 MG/1
1000 INJECTION, POWDER, FOR SOLUTION INTRAMUSCULAR; INTRAVENOUS EVERY 24 HOURS
Refills: 0 | Status: COMPLETED | OUTPATIENT
Start: 2023-01-01 | End: 2023-01-01

## 2023-01-01 RX ORDER — ROBINUL 0.2 MG/ML
0.4 INJECTION INTRAMUSCULAR; INTRAVENOUS EVERY 6 HOURS
Refills: 0 | Status: DISCONTINUED | OUTPATIENT
Start: 2023-01-01 | End: 2023-01-01

## 2023-01-01 RX ORDER — LEVETIRACETAM 250 MG/1
500 TABLET, FILM COATED ORAL EVERY 12 HOURS
Refills: 0 | Status: DISCONTINUED | OUTPATIENT
Start: 2023-01-01 | End: 2023-01-01

## 2023-01-01 RX ORDER — DEXTROSE 50 % IN WATER 50 %
25 SYRINGE (ML) INTRAVENOUS ONCE
Refills: 0 | Status: DISCONTINUED | OUTPATIENT
Start: 2023-01-01 | End: 2023-01-01

## 2023-01-01 RX ORDER — SIMVASTATIN 20 MG/1
20 TABLET, FILM COATED ORAL AT BEDTIME
Refills: 0 | Status: DISCONTINUED | OUTPATIENT
Start: 2023-01-01 | End: 2023-01-01

## 2023-01-01 RX ORDER — PROPOFOL 10 MG/ML
10 INJECTION, EMULSION INTRAVENOUS
Qty: 500 | Refills: 0 | Status: DISCONTINUED | OUTPATIENT
Start: 2023-01-01 | End: 2023-01-01

## 2023-01-01 RX ORDER — SODIUM CHLORIDE 9 MG/ML
1000 INJECTION INTRAMUSCULAR; INTRAVENOUS; SUBCUTANEOUS
Refills: 0 | Status: DISCONTINUED | OUTPATIENT
Start: 2023-01-01 | End: 2023-01-01

## 2023-01-01 RX ORDER — ACETAMINOPHEN 500 MG
1000 TABLET ORAL ONCE
Refills: 0 | Status: COMPLETED | OUTPATIENT
Start: 2023-01-01 | End: 2023-01-01

## 2023-01-01 RX ORDER — ACETAMINOPHEN 500 MG
650 TABLET ORAL EVERY 6 HOURS
Refills: 0 | Status: DISCONTINUED | OUTPATIENT
Start: 2023-01-01 | End: 2023-01-01

## 2023-01-01 RX ORDER — PROPOFOL 10 MG/ML
10 INJECTION, EMULSION INTRAVENOUS
Qty: 1000 | Refills: 0 | Status: DISCONTINUED | OUTPATIENT
Start: 2023-01-01 | End: 2023-01-01

## 2023-01-01 RX ORDER — ATORVASTATIN CALCIUM 80 MG/1
0 TABLET, FILM COATED ORAL
Qty: 0 | Refills: 0 | DISCHARGE

## 2023-01-01 RX ORDER — AZITHROMYCIN 500 MG/1
500 TABLET, FILM COATED ORAL ONCE
Refills: 0 | Status: COMPLETED | OUTPATIENT
Start: 2023-01-01 | End: 2023-01-01

## 2023-01-01 RX ORDER — DEXTROSE 50 % IN WATER 50 %
15 SYRINGE (ML) INTRAVENOUS ONCE
Refills: 0 | Status: DISCONTINUED | OUTPATIENT
Start: 2023-01-01 | End: 2023-01-01

## 2023-01-01 RX ORDER — CEFTRIAXONE 500 MG/1
1000 INJECTION, POWDER, FOR SOLUTION INTRAMUSCULAR; INTRAVENOUS EVERY 24 HOURS
Refills: 0 | Status: DISCONTINUED | OUTPATIENT
Start: 2023-01-01 | End: 2023-01-01

## 2023-01-01 RX ORDER — AMLODIPINE BESYLATE 2.5 MG/1
0 TABLET ORAL
Qty: 0 | Refills: 0 | DISCHARGE

## 2023-01-01 RX ORDER — INFLUENZA VIRUS VACCINE 15; 15; 15; 15 UG/.5ML; UG/.5ML; UG/.5ML; UG/.5ML
0.7 SUSPENSION INTRAMUSCULAR ONCE
Refills: 0 | Status: DISCONTINUED | OUTPATIENT
Start: 2023-01-01 | End: 2023-01-01

## 2023-01-01 RX ORDER — NOREPINEPHRINE BITARTRATE/D5W 8 MG/250ML
0.05 PLASTIC BAG, INJECTION (ML) INTRAVENOUS
Qty: 8 | Refills: 0 | Status: DISCONTINUED | OUTPATIENT
Start: 2023-01-01 | End: 2023-01-01

## 2023-01-01 RX ORDER — HYDRALAZINE HCL 50 MG
5 TABLET ORAL ONCE
Refills: 0 | Status: COMPLETED | OUTPATIENT
Start: 2023-01-01 | End: 2023-01-01

## 2023-01-01 RX ORDER — SIMVASTATIN 20 MG/1
1 TABLET, FILM COATED ORAL
Qty: 0 | Refills: 0 | DISCHARGE

## 2023-01-01 RX ORDER — LABETALOL HCL 100 MG
1 TABLET ORAL
Qty: 0 | Refills: 0 | DISCHARGE
Start: 2023-01-01

## 2023-01-01 RX ORDER — MIDAZOLAM HYDROCHLORIDE 1 MG/ML
2 INJECTION, SOLUTION INTRAMUSCULAR; INTRAVENOUS
Refills: 0 | Status: DISCONTINUED | OUTPATIENT
Start: 2023-01-01 | End: 2023-01-01

## 2023-01-01 RX ORDER — SODIUM CHLORIDE 9 MG/ML
4 INJECTION INTRAMUSCULAR; INTRAVENOUS; SUBCUTANEOUS EVERY 6 HOURS
Refills: 0 | Status: DISCONTINUED | OUTPATIENT
Start: 2023-01-01 | End: 2023-01-01

## 2023-01-01 RX ORDER — CHLORHEXIDINE GLUCONATE 213 G/1000ML
1 SOLUTION TOPICAL
Refills: 0 | Status: DISCONTINUED | OUTPATIENT
Start: 2023-01-01 | End: 2023-01-01

## 2023-01-01 RX ORDER — MAGNESIUM SULFATE 500 MG/ML
2 VIAL (ML) INJECTION ONCE
Refills: 0 | Status: COMPLETED | OUTPATIENT
Start: 2023-01-01 | End: 2023-01-01

## 2023-01-01 RX ORDER — MIDAZOLAM HYDROCHLORIDE 1 MG/ML
4 INJECTION, SOLUTION INTRAMUSCULAR; INTRAVENOUS
Refills: 0 | Status: DISCONTINUED | OUTPATIENT
Start: 2023-01-01 | End: 2023-01-01

## 2023-01-01 RX ORDER — PANTOPRAZOLE SODIUM 20 MG/1
40 TABLET, DELAYED RELEASE ORAL DAILY
Refills: 0 | Status: DISCONTINUED | OUTPATIENT
Start: 2023-01-01 | End: 2023-01-01

## 2023-01-01 RX ORDER — AMLODIPINE BESYLATE 2.5 MG/1
5 TABLET ORAL DAILY
Refills: 0 | Status: DISCONTINUED | OUTPATIENT
Start: 2023-01-01 | End: 2023-01-01

## 2023-01-01 RX ORDER — MIDAZOLAM HYDROCHLORIDE 1 MG/ML
4 INJECTION, SOLUTION INTRAMUSCULAR; INTRAVENOUS ONCE
Refills: 0 | Status: DISCONTINUED | OUTPATIENT
Start: 2023-01-01 | End: 2023-01-01

## 2023-01-01 RX ORDER — METOPROLOL TARTRATE 50 MG
25 TABLET ORAL DAILY
Refills: 0 | Status: DISCONTINUED | OUTPATIENT
Start: 2023-01-01 | End: 2023-01-01

## 2023-01-01 RX ORDER — HYDRALAZINE HCL 50 MG
10 TABLET ORAL ONCE
Refills: 0 | Status: COMPLETED | OUTPATIENT
Start: 2023-01-01 | End: 2023-01-01

## 2023-01-01 RX ORDER — ASPIRIN/CALCIUM CARB/MAGNESIUM 324 MG
81 TABLET ORAL DAILY
Refills: 0 | Status: DISCONTINUED | OUTPATIENT
Start: 2023-01-01 | End: 2023-01-01

## 2023-01-01 RX ORDER — SODIUM CHLORIDE 9 MG/ML
500 INJECTION, SOLUTION INTRAVENOUS
Refills: 0 | Status: DISCONTINUED | OUTPATIENT
Start: 2023-01-01 | End: 2023-01-01

## 2023-01-01 RX ORDER — VALSARTAN 80 MG/1
0 TABLET ORAL
Qty: 0 | Refills: 0 | DISCHARGE

## 2023-01-01 RX ORDER — HEPARIN SODIUM 5000 [USP'U]/ML
5000 INJECTION INTRAVENOUS; SUBCUTANEOUS EVERY 12 HOURS
Refills: 0 | Status: DISCONTINUED | OUTPATIENT
Start: 2023-01-01 | End: 2023-01-01

## 2023-01-01 RX ORDER — DIPHENHYDRAMINE HCL 50 MG
12.5 CAPSULE ORAL ONCE
Refills: 0 | Status: COMPLETED | OUTPATIENT
Start: 2023-01-01 | End: 2023-01-01

## 2023-01-01 RX ORDER — SODIUM CHLORIDE 9 MG/ML
1000 INJECTION INTRAMUSCULAR; INTRAVENOUS; SUBCUTANEOUS ONCE
Refills: 0 | Status: COMPLETED | OUTPATIENT
Start: 2023-01-01 | End: 2023-01-01

## 2023-01-01 RX ORDER — INSULIN LISPRO 100/ML
VIAL (ML) SUBCUTANEOUS EVERY 6 HOURS
Refills: 0 | Status: DISCONTINUED | OUTPATIENT
Start: 2023-01-01 | End: 2023-01-01

## 2023-01-01 RX ORDER — HEPARIN SODIUM 5000 [USP'U]/ML
5000 INJECTION INTRAVENOUS; SUBCUTANEOUS EVERY 8 HOURS
Refills: 0 | Status: DISCONTINUED | OUTPATIENT
Start: 2023-01-01 | End: 2023-01-01

## 2023-01-01 RX ORDER — LABETALOL HCL 100 MG
10 TABLET ORAL ONCE
Refills: 0 | Status: COMPLETED | OUTPATIENT
Start: 2023-01-01 | End: 2023-01-01

## 2023-01-01 RX ORDER — IPRATROPIUM/ALBUTEROL SULFATE 18-103MCG
3 AEROSOL WITH ADAPTER (GRAM) INHALATION EVERY 6 HOURS
Refills: 0 | Status: DISCONTINUED | OUTPATIENT
Start: 2023-01-01 | End: 2023-01-01

## 2023-01-01 RX ORDER — AMLODIPINE BESYLATE 2.5 MG/1
1 TABLET ORAL
Qty: 0 | Refills: 0 | DISCHARGE

## 2023-01-01 RX ORDER — CEFTRIAXONE 500 MG/1
1000 INJECTION, POWDER, FOR SOLUTION INTRAMUSCULAR; INTRAVENOUS ONCE
Refills: 0 | Status: COMPLETED | OUTPATIENT
Start: 2023-01-01 | End: 2023-01-01

## 2023-01-01 RX ORDER — LANOLIN ALCOHOL/MO/W.PET/CERES
3 CREAM (GRAM) TOPICAL AT BEDTIME
Refills: 0 | Status: DISCONTINUED | OUTPATIENT
Start: 2023-01-01 | End: 2023-01-01

## 2023-01-01 RX ORDER — LEVETIRACETAM 250 MG/1
1500 TABLET, FILM COATED ORAL ONCE
Refills: 0 | Status: COMPLETED | OUTPATIENT
Start: 2023-01-01 | End: 2023-01-01

## 2023-01-01 RX ORDER — DEXTROSE 50 % IN WATER 50 %
12.5 SYRINGE (ML) INTRAVENOUS ONCE
Refills: 0 | Status: DISCONTINUED | OUTPATIENT
Start: 2023-01-01 | End: 2023-01-01

## 2023-01-01 RX ORDER — GLUCAGON INJECTION, SOLUTION 0.5 MG/.1ML
1 INJECTION, SOLUTION SUBCUTANEOUS ONCE
Refills: 0 | Status: DISCONTINUED | OUTPATIENT
Start: 2023-01-01 | End: 2023-01-01

## 2023-01-01 RX ORDER — MIDAZOLAM HYDROCHLORIDE 1 MG/ML
2 INJECTION, SOLUTION INTRAMUSCULAR; INTRAVENOUS EVERY 4 HOURS
Refills: 0 | Status: DISCONTINUED | OUTPATIENT
Start: 2023-01-01 | End: 2023-01-01

## 2023-01-01 RX ORDER — CEFTRIAXONE 500 MG/1
INJECTION, POWDER, FOR SOLUTION INTRAMUSCULAR; INTRAVENOUS
Refills: 0 | Status: COMPLETED | OUTPATIENT
Start: 2023-01-01 | End: 2023-01-01

## 2023-01-01 RX ORDER — MORPHINE SULFATE 50 MG/1
2 CAPSULE, EXTENDED RELEASE ORAL
Refills: 0 | Status: DISCONTINUED | OUTPATIENT
Start: 2023-01-01 | End: 2023-01-01

## 2023-01-01 RX ORDER — VALSARTAN 80 MG/1
1 TABLET ORAL
Qty: 0 | Refills: 0 | DISCHARGE

## 2023-01-01 RX ORDER — LABETALOL HCL 100 MG
100 TABLET ORAL
Refills: 0 | Status: DISCONTINUED | OUTPATIENT
Start: 2023-01-01 | End: 2023-01-01

## 2023-01-01 RX ORDER — CHLORHEXIDINE GLUCONATE 213 G/1000ML
15 SOLUTION TOPICAL EVERY 12 HOURS
Refills: 0 | Status: DISCONTINUED | OUTPATIENT
Start: 2023-01-01 | End: 2023-01-01

## 2023-01-01 RX ORDER — ASPIRIN/CALCIUM CARB/MAGNESIUM 324 MG
1 TABLET ORAL
Qty: 0 | Refills: 0 | DISCHARGE

## 2023-01-01 RX ORDER — IPRATROPIUM/ALBUTEROL SULFATE 18-103MCG
3 AEROSOL WITH ADAPTER (GRAM) INHALATION ONCE
Refills: 0 | Status: COMPLETED | OUTPATIENT
Start: 2023-01-01 | End: 2023-01-01

## 2023-01-01 RX ADMIN — HEPARIN SODIUM 5000 UNIT(S): 5000 INJECTION INTRAVENOUS; SUBCUTANEOUS at 05:21

## 2023-01-01 RX ADMIN — PROPOFOL 6.02 MICROGRAM(S)/KG/MIN: 10 INJECTION, EMULSION INTRAVENOUS at 13:57

## 2023-01-01 RX ADMIN — HEPARIN SODIUM 5000 UNIT(S): 5000 INJECTION INTRAVENOUS; SUBCUTANEOUS at 05:19

## 2023-01-01 RX ADMIN — Medication 9.41 MICROGRAM(S)/KG/MIN: at 13:14

## 2023-01-01 RX ADMIN — Medication 3 MILLILITER(S): at 17:06

## 2023-01-01 RX ADMIN — PROPOFOL 6.02 MICROGRAM(S)/KG/MIN: 10 INJECTION, EMULSION INTRAVENOUS at 19:15

## 2023-01-01 RX ADMIN — Medication 650 MILLIGRAM(S): at 12:46

## 2023-01-01 RX ADMIN — LEVETIRACETAM 420 MILLIGRAM(S): 250 TABLET, FILM COATED ORAL at 17:11

## 2023-01-01 RX ADMIN — Medication 9.41 MICROGRAM(S)/KG/MIN: at 07:42

## 2023-01-01 RX ADMIN — SIMVASTATIN 20 MILLIGRAM(S): 20 TABLET, FILM COATED ORAL at 21:48

## 2023-01-01 RX ADMIN — Medication 5 MILLIGRAM(S): at 01:46

## 2023-01-01 RX ADMIN — AZITHROMYCIN 255 MILLIGRAM(S): 500 TABLET, FILM COATED ORAL at 13:01

## 2023-01-01 RX ADMIN — PROPOFOL 6.02 MICROGRAM(S)/KG/MIN: 10 INJECTION, EMULSION INTRAVENOUS at 03:47

## 2023-01-01 RX ADMIN — Medication 9.41 MICROGRAM(S)/KG/MIN: at 19:28

## 2023-01-01 RX ADMIN — Medication 81 MILLIGRAM(S): at 17:38

## 2023-01-01 RX ADMIN — Medication 10 MILLIGRAM(S): at 21:49

## 2023-01-01 RX ADMIN — Medication 7.63 MICROGRAM(S)/KG/MIN: at 22:58

## 2023-01-01 RX ADMIN — PROPOFOL 6.02 MICROGRAM(S)/KG/MIN: 10 INJECTION, EMULSION INTRAVENOUS at 22:46

## 2023-01-01 RX ADMIN — Medication 100 MILLIGRAM(S): at 18:17

## 2023-01-01 RX ADMIN — PROPOFOL 6.02 MICROGRAM(S)/KG/MIN: 10 INJECTION, EMULSION INTRAVENOUS at 19:07

## 2023-01-01 RX ADMIN — LEVETIRACETAM 420 MILLIGRAM(S): 250 TABLET, FILM COATED ORAL at 17:35

## 2023-01-01 RX ADMIN — MIDAZOLAM HYDROCHLORIDE 4 MILLIGRAM(S): 1 INJECTION, SOLUTION INTRAMUSCULAR; INTRAVENOUS at 05:09

## 2023-01-01 RX ADMIN — Medication 3 MILLILITER(S): at 11:27

## 2023-01-01 RX ADMIN — MIDAZOLAM HYDROCHLORIDE 4 MILLIGRAM(S): 1 INJECTION, SOLUTION INTRAMUSCULAR; INTRAVENOUS at 08:55

## 2023-01-01 RX ADMIN — PROPOFOL 6.02 MICROGRAM(S)/KG/MIN: 10 INJECTION, EMULSION INTRAVENOUS at 19:40

## 2023-01-01 RX ADMIN — PROPOFOL 6.02 MICROGRAM(S)/KG/MIN: 10 INJECTION, EMULSION INTRAVENOUS at 05:03

## 2023-01-01 RX ADMIN — CHLORHEXIDINE GLUCONATE 15 MILLILITER(S): 213 SOLUTION TOPICAL at 17:06

## 2023-01-01 RX ADMIN — MIDAZOLAM HYDROCHLORIDE 4 MILLIGRAM(S): 1 INJECTION, SOLUTION INTRAMUSCULAR; INTRAVENOUS at 20:25

## 2023-01-01 RX ADMIN — Medication 3 MILLILITER(S): at 05:14

## 2023-01-01 RX ADMIN — Medication 100 MILLIGRAM(S): at 05:19

## 2023-01-01 RX ADMIN — CHLORHEXIDINE GLUCONATE 15 MILLILITER(S): 213 SOLUTION TOPICAL at 17:10

## 2023-01-01 RX ADMIN — Medication 3 MILLILITER(S): at 01:03

## 2023-01-01 RX ADMIN — HEPARIN SODIUM 5000 UNIT(S): 5000 INJECTION INTRAVENOUS; SUBCUTANEOUS at 05:02

## 2023-01-01 RX ADMIN — Medication 9.53 MICROGRAM(S)/KG/MIN: at 18:25

## 2023-01-01 RX ADMIN — CHLORHEXIDINE GLUCONATE 15 MILLILITER(S): 213 SOLUTION TOPICAL at 05:35

## 2023-01-01 RX ADMIN — INSULIN GLARGINE 15 UNIT(S): 100 INJECTION, SOLUTION SUBCUTANEOUS at 23:29

## 2023-01-01 RX ADMIN — CEFTRIAXONE 100 MILLIGRAM(S): 500 INJECTION, POWDER, FOR SOLUTION INTRAMUSCULAR; INTRAVENOUS at 15:41

## 2023-01-01 RX ADMIN — SODIUM CHLORIDE 1000 MILLILITER(S): 9 INJECTION INTRAMUSCULAR; INTRAVENOUS; SUBCUTANEOUS at 15:12

## 2023-01-01 RX ADMIN — AMLODIPINE BESYLATE 5 MILLIGRAM(S): 2.5 TABLET ORAL at 05:19

## 2023-01-01 RX ADMIN — HEPARIN SODIUM 5000 UNIT(S): 5000 INJECTION INTRAVENOUS; SUBCUTANEOUS at 21:29

## 2023-01-01 RX ADMIN — PANTOPRAZOLE SODIUM 40 MILLIGRAM(S): 20 TABLET, DELAYED RELEASE ORAL at 11:26

## 2023-01-01 RX ADMIN — Medication 3 MILLILITER(S): at 15:27

## 2023-01-01 RX ADMIN — Medication 2: at 17:13

## 2023-01-01 RX ADMIN — Medication 1000 MILLIGRAM(S): at 21:21

## 2023-01-01 RX ADMIN — SODIUM CHLORIDE 500 MILLILITER(S): 9 INJECTION, SOLUTION INTRAVENOUS at 17:12

## 2023-01-01 RX ADMIN — PROPOFOL 6.02 MICROGRAM(S)/KG/MIN: 10 INJECTION, EMULSION INTRAVENOUS at 06:06

## 2023-01-01 RX ADMIN — Medication 2: at 11:10

## 2023-01-01 RX ADMIN — MIDAZOLAM HYDROCHLORIDE 4 MILLIGRAM(S): 1 INJECTION, SOLUTION INTRAMUSCULAR; INTRAVENOUS at 23:19

## 2023-01-01 RX ADMIN — Medication 10 MILLIGRAM(S): at 16:45

## 2023-01-01 RX ADMIN — HEPARIN SODIUM 5000 UNIT(S): 5000 INJECTION INTRAVENOUS; SUBCUTANEOUS at 05:41

## 2023-01-01 RX ADMIN — PROPOFOL 6.02 MICROGRAM(S)/KG/MIN: 10 INJECTION, EMULSION INTRAVENOUS at 23:00

## 2023-01-01 RX ADMIN — Medication 125 MILLIGRAM(S): at 12:59

## 2023-01-01 RX ADMIN — MIDAZOLAM HYDROCHLORIDE 4 MILLIGRAM(S): 1 INJECTION, SOLUTION INTRAMUSCULAR; INTRAVENOUS at 00:24

## 2023-01-01 RX ADMIN — VALSARTAN 320 MILLIGRAM(S): 80 TABLET ORAL at 05:09

## 2023-01-01 RX ADMIN — Medication 3 MILLILITER(S): at 11:13

## 2023-01-01 RX ADMIN — MIDAZOLAM HYDROCHLORIDE 2 MILLIGRAM(S): 1 INJECTION, SOLUTION INTRAMUSCULAR; INTRAVENOUS at 13:33

## 2023-01-01 RX ADMIN — PROPOFOL 6.02 MICROGRAM(S)/KG/MIN: 10 INJECTION, EMULSION INTRAVENOUS at 15:56

## 2023-01-01 RX ADMIN — CHLORHEXIDINE GLUCONATE 15 MILLILITER(S): 213 SOLUTION TOPICAL at 06:03

## 2023-01-01 RX ADMIN — Medication 3 MILLILITER(S): at 00:29

## 2023-01-01 RX ADMIN — SODIUM CHLORIDE 1000 MILLILITER(S): 9 INJECTION INTRAMUSCULAR; INTRAVENOUS; SUBCUTANEOUS at 13:09

## 2023-01-01 RX ADMIN — PROPOFOL 6.02 MICROGRAM(S)/KG/MIN: 10 INJECTION, EMULSION INTRAVENOUS at 02:07

## 2023-01-01 RX ADMIN — HEPARIN SODIUM 5000 UNIT(S): 5000 INJECTION INTRAVENOUS; SUBCUTANEOUS at 18:11

## 2023-01-01 RX ADMIN — HEPARIN SODIUM 5000 UNIT(S): 5000 INJECTION INTRAVENOUS; SUBCUTANEOUS at 06:41

## 2023-01-01 RX ADMIN — Medication 3 MILLILITER(S): at 13:00

## 2023-01-01 RX ADMIN — HEPARIN SODIUM 5000 UNIT(S): 5000 INJECTION INTRAVENOUS; SUBCUTANEOUS at 13:41

## 2023-01-01 RX ADMIN — SODIUM CHLORIDE 1000 MILLILITER(S): 9 INJECTION, SOLUTION INTRAVENOUS at 09:12

## 2023-01-01 RX ADMIN — Medication 25 MILLIGRAM(S): at 23:55

## 2023-01-01 RX ADMIN — PROPOFOL 6.02 MICROGRAM(S)/KG/MIN: 10 INJECTION, EMULSION INTRAVENOUS at 18:26

## 2023-01-01 RX ADMIN — ROBINUL 0.4 MILLIGRAM(S): 0.2 INJECTION INTRAMUSCULAR; INTRAVENOUS at 15:37

## 2023-01-01 RX ADMIN — Medication 20 MILLIGRAM(S): at 18:40

## 2023-01-01 RX ADMIN — LEVETIRACETAM 420 MILLIGRAM(S): 250 TABLET, FILM COATED ORAL at 05:52

## 2023-01-01 RX ADMIN — HEPARIN SODIUM 5000 UNIT(S): 5000 INJECTION INTRAVENOUS; SUBCUTANEOUS at 13:14

## 2023-01-01 RX ADMIN — MIDAZOLAM HYDROCHLORIDE 4 MILLIGRAM(S): 1 INJECTION, SOLUTION INTRAMUSCULAR; INTRAVENOUS at 18:21

## 2023-01-01 RX ADMIN — SIMVASTATIN 20 MILLIGRAM(S): 20 TABLET, FILM COATED ORAL at 21:18

## 2023-01-01 RX ADMIN — Medication 25 MILLIGRAM(S): at 06:22

## 2023-01-01 RX ADMIN — SODIUM CHLORIDE 4 MILLILITER(S): 9 INJECTION INTRAMUSCULAR; INTRAVENOUS; SUBCUTANEOUS at 00:26

## 2023-01-01 RX ADMIN — MIDAZOLAM HYDROCHLORIDE 2 MILLIGRAM(S): 1 INJECTION, SOLUTION INTRAMUSCULAR; INTRAVENOUS at 10:11

## 2023-01-01 RX ADMIN — HEPARIN SODIUM 5000 UNIT(S): 5000 INJECTION INTRAVENOUS; SUBCUTANEOUS at 18:43

## 2023-01-01 RX ADMIN — Medication 3 MILLILITER(S): at 18:22

## 2023-01-01 RX ADMIN — Medication 81 MILLIGRAM(S): at 11:56

## 2023-01-01 RX ADMIN — CHLORHEXIDINE GLUCONATE 1 APPLICATION(S): 213 SOLUTION TOPICAL at 04:45

## 2023-01-01 RX ADMIN — AZITHROMYCIN 255 MILLIGRAM(S): 500 TABLET, FILM COATED ORAL at 13:13

## 2023-01-01 RX ADMIN — Medication 3 MILLILITER(S): at 06:43

## 2023-01-01 RX ADMIN — CHLORHEXIDINE GLUCONATE 15 MILLILITER(S): 213 SOLUTION TOPICAL at 17:35

## 2023-01-01 RX ADMIN — PROPOFOL 6.02 MICROGRAM(S)/KG/MIN: 10 INJECTION, EMULSION INTRAVENOUS at 07:45

## 2023-01-01 RX ADMIN — PROPOFOL 6.02 MICROGRAM(S)/KG/MIN: 10 INJECTION, EMULSION INTRAVENOUS at 17:35

## 2023-01-01 RX ADMIN — Medication 100 MILLIGRAM(S): at 23:36

## 2023-01-01 RX ADMIN — CEFTRIAXONE 100 MILLIGRAM(S): 500 INJECTION, POWDER, FOR SOLUTION INTRAMUSCULAR; INTRAVENOUS at 22:42

## 2023-01-01 RX ADMIN — MIDAZOLAM HYDROCHLORIDE 4 MILLIGRAM(S): 1 INJECTION, SOLUTION INTRAMUSCULAR; INTRAVENOUS at 14:52

## 2023-01-01 RX ADMIN — HEPARIN SODIUM 5000 UNIT(S): 5000 INJECTION INTRAVENOUS; SUBCUTANEOUS at 21:35

## 2023-01-01 RX ADMIN — CHLORHEXIDINE GLUCONATE 1 APPLICATION(S): 213 SOLUTION TOPICAL at 11:12

## 2023-01-01 RX ADMIN — Medication 3 MILLILITER(S): at 17:09

## 2023-01-01 RX ADMIN — Medication 2 MILLIGRAM(S): at 15:37

## 2023-01-01 RX ADMIN — LEVETIRACETAM 420 MILLIGRAM(S): 250 TABLET, FILM COATED ORAL at 17:06

## 2023-01-01 RX ADMIN — Medication 9.53 MICROGRAM(S)/KG/MIN: at 14:25

## 2023-01-01 RX ADMIN — AMLODIPINE BESYLATE 5 MILLIGRAM(S): 2.5 TABLET ORAL at 05:09

## 2023-01-01 RX ADMIN — Medication 3 MILLILITER(S): at 06:08

## 2023-01-01 RX ADMIN — Medication 3 MILLILITER(S): at 00:26

## 2023-01-01 RX ADMIN — CHLORHEXIDINE GLUCONATE 15 MILLILITER(S): 213 SOLUTION TOPICAL at 05:03

## 2023-01-01 RX ADMIN — PROPOFOL 6.02 MICROGRAM(S)/KG/MIN: 10 INJECTION, EMULSION INTRAVENOUS at 00:03

## 2023-01-01 RX ADMIN — Medication 25 MILLIGRAM(S): at 05:21

## 2023-01-01 RX ADMIN — SODIUM CHLORIDE 4 MILLILITER(S): 9 INJECTION INTRAMUSCULAR; INTRAVENOUS; SUBCUTANEOUS at 11:21

## 2023-01-01 RX ADMIN — Medication 3 MILLILITER(S): at 13:04

## 2023-01-01 RX ADMIN — PROPOFOL 6.02 MICROGRAM(S)/KG/MIN: 10 INJECTION, EMULSION INTRAVENOUS at 08:46

## 2023-01-01 RX ADMIN — Medication 3 MILLILITER(S): at 17:26

## 2023-01-01 RX ADMIN — CHLORHEXIDINE GLUCONATE 1 APPLICATION(S): 213 SOLUTION TOPICAL at 02:30

## 2023-01-01 RX ADMIN — HEPARIN SODIUM 5000 UNIT(S): 5000 INJECTION INTRAVENOUS; SUBCUTANEOUS at 23:12

## 2023-01-01 RX ADMIN — SODIUM CHLORIDE 4 MILLILITER(S): 9 INJECTION INTRAMUSCULAR; INTRAVENOUS; SUBCUTANEOUS at 17:09

## 2023-01-01 RX ADMIN — PROPOFOL 6.02 MICROGRAM(S)/KG/MIN: 10 INJECTION, EMULSION INTRAVENOUS at 21:36

## 2023-01-01 RX ADMIN — PROPOFOL 6.02 MICROGRAM(S)/KG/MIN: 10 INJECTION, EMULSION INTRAVENOUS at 10:12

## 2023-01-01 RX ADMIN — AZITHROMYCIN 255 MILLIGRAM(S): 500 TABLET, FILM COATED ORAL at 15:35

## 2023-01-01 RX ADMIN — AMLODIPINE BESYLATE 5 MILLIGRAM(S): 2.5 TABLET ORAL at 05:40

## 2023-01-01 RX ADMIN — HEPARIN SODIUM 5000 UNIT(S): 5000 INJECTION INTRAVENOUS; SUBCUTANEOUS at 13:37

## 2023-01-01 RX ADMIN — AMLODIPINE BESYLATE 5 MILLIGRAM(S): 2.5 TABLET ORAL at 05:21

## 2023-01-01 RX ADMIN — HEPARIN SODIUM 5000 UNIT(S): 5000 INJECTION INTRAVENOUS; SUBCUTANEOUS at 13:00

## 2023-01-01 RX ADMIN — Medication 7.63 MICROGRAM(S)/KG/MIN: at 11:11

## 2023-01-01 RX ADMIN — Medication 3 MILLILITER(S): at 11:53

## 2023-01-01 RX ADMIN — PROPOFOL 6.02 MICROGRAM(S)/KG/MIN: 10 INJECTION, EMULSION INTRAVENOUS at 08:56

## 2023-01-01 RX ADMIN — CEFTRIAXONE 100 MILLIGRAM(S): 500 INJECTION, POWDER, FOR SOLUTION INTRAMUSCULAR; INTRAVENOUS at 21:18

## 2023-01-01 RX ADMIN — HEPARIN SODIUM 5000 UNIT(S): 5000 INJECTION INTRAVENOUS; SUBCUTANEOUS at 06:03

## 2023-01-01 RX ADMIN — Medication 3 MILLILITER(S): at 23:18

## 2023-01-01 RX ADMIN — PROPOFOL 6.02 MICROGRAM(S)/KG/MIN: 10 INJECTION, EMULSION INTRAVENOUS at 07:42

## 2023-01-01 RX ADMIN — PROPOFOL 6.02 MICROGRAM(S)/KG/MIN: 10 INJECTION, EMULSION INTRAVENOUS at 20:00

## 2023-01-01 RX ADMIN — PROPOFOL 6.02 MICROGRAM(S)/KG/MIN: 10 INJECTION, EMULSION INTRAVENOUS at 16:00

## 2023-01-01 RX ADMIN — PROPOFOL 6.02 MICROGRAM(S)/KG/MIN: 10 INJECTION, EMULSION INTRAVENOUS at 23:53

## 2023-01-01 RX ADMIN — Medication 81 MILLIGRAM(S): at 11:07

## 2023-01-01 RX ADMIN — Medication 3 MILLILITER(S): at 13:07

## 2023-01-01 RX ADMIN — AMLODIPINE BESYLATE 5 MILLIGRAM(S): 2.5 TABLET ORAL at 23:51

## 2023-01-01 RX ADMIN — Medication 81 MILLIGRAM(S): at 11:42

## 2023-01-01 RX ADMIN — Medication 3 MILLILITER(S): at 11:21

## 2023-01-01 RX ADMIN — Medication 81 MILLIGRAM(S): at 11:18

## 2023-01-01 RX ADMIN — SODIUM CHLORIDE 75 MILLILITER(S): 9 INJECTION INTRAMUSCULAR; INTRAVENOUS; SUBCUTANEOUS at 23:41

## 2023-01-01 RX ADMIN — PROPOFOL 6.02 MICROGRAM(S)/KG/MIN: 10 INJECTION, EMULSION INTRAVENOUS at 05:52

## 2023-01-01 RX ADMIN — Medication 5 MILLIGRAM(S): at 21:25

## 2023-01-01 RX ADMIN — LEVETIRACETAM 400 MILLIGRAM(S): 250 TABLET, FILM COATED ORAL at 19:53

## 2023-01-01 RX ADMIN — Medication 100 MILLIGRAM(S): at 18:40

## 2023-01-01 RX ADMIN — HEPARIN SODIUM 5000 UNIT(S): 5000 INJECTION INTRAVENOUS; SUBCUTANEOUS at 05:35

## 2023-01-01 RX ADMIN — Medication 650 MILLIGRAM(S): at 11:56

## 2023-01-01 RX ADMIN — HEPARIN SODIUM 5000 UNIT(S): 5000 INJECTION INTRAVENOUS; SUBCUTANEOUS at 05:10

## 2023-01-01 RX ADMIN — Medication 3 MILLILITER(S): at 05:13

## 2023-01-01 RX ADMIN — SODIUM CHLORIDE 4 MILLILITER(S): 9 INJECTION INTRAMUSCULAR; INTRAVENOUS; SUBCUTANEOUS at 05:22

## 2023-01-01 RX ADMIN — LEVETIRACETAM 420 MILLIGRAM(S): 250 TABLET, FILM COATED ORAL at 05:35

## 2023-01-01 RX ADMIN — CHLORHEXIDINE GLUCONATE 1 APPLICATION(S): 213 SOLUTION TOPICAL at 09:12

## 2023-01-01 RX ADMIN — PROPOFOL 6.02 MICROGRAM(S)/KG/MIN: 10 INJECTION, EMULSION INTRAVENOUS at 11:06

## 2023-01-01 RX ADMIN — Medication 20 MILLIGRAM(S): at 23:42

## 2023-01-01 RX ADMIN — PROPOFOL 6.02 MICROGRAM(S)/KG/MIN: 10 INJECTION, EMULSION INTRAVENOUS at 07:32

## 2023-01-01 RX ADMIN — CHLORHEXIDINE GLUCONATE 15 MILLILITER(S): 213 SOLUTION TOPICAL at 17:13

## 2023-01-01 RX ADMIN — PROPOFOL 6.02 MICROGRAM(S)/KG/MIN: 10 INJECTION, EMULSION INTRAVENOUS at 19:28

## 2023-01-01 RX ADMIN — Medication 200 MILLIGRAM(S): at 13:56

## 2023-01-01 RX ADMIN — Medication 81 MILLIGRAM(S): at 15:09

## 2023-01-01 RX ADMIN — Medication 15.3 MICROGRAM(S)/KG/MIN: at 07:43

## 2023-01-01 RX ADMIN — Medication 25 MILLIGRAM(S): at 05:40

## 2023-01-01 RX ADMIN — Medication 150 GRAM(S): at 13:03

## 2023-01-01 RX ADMIN — HEPARIN SODIUM 5000 UNIT(S): 5000 INJECTION INTRAVENOUS; SUBCUTANEOUS at 21:31

## 2023-01-01 RX ADMIN — CEFTRIAXONE 100 MILLIGRAM(S): 500 INJECTION, POWDER, FOR SOLUTION INTRAMUSCULAR; INTRAVENOUS at 23:51

## 2023-01-01 RX ADMIN — HEPARIN SODIUM 5000 UNIT(S): 5000 INJECTION INTRAVENOUS; SUBCUTANEOUS at 18:17

## 2023-01-01 RX ADMIN — PROPOFOL 6.02 MICROGRAM(S)/KG/MIN: 10 INJECTION, EMULSION INTRAVENOUS at 10:07

## 2023-01-01 RX ADMIN — HEPARIN SODIUM 5000 UNIT(S): 5000 INJECTION INTRAVENOUS; SUBCUTANEOUS at 23:30

## 2023-01-01 RX ADMIN — Medication 81 MILLIGRAM(S): at 11:09

## 2023-01-01 RX ADMIN — Medication 3 MILLIGRAM(S): at 21:48

## 2023-01-01 RX ADMIN — Medication 2: at 23:10

## 2023-01-01 RX ADMIN — Medication 3 MILLILITER(S): at 05:23

## 2023-01-01 RX ADMIN — CEFTRIAXONE 100 MILLIGRAM(S): 500 INJECTION, POWDER, FOR SOLUTION INTRAMUSCULAR; INTRAVENOUS at 21:52

## 2023-01-01 RX ADMIN — SIMVASTATIN 20 MILLIGRAM(S): 20 TABLET, FILM COATED ORAL at 23:55

## 2023-01-01 RX ADMIN — Medication 81 MILLIGRAM(S): at 11:24

## 2023-01-01 RX ADMIN — HEPARIN SODIUM 5000 UNIT(S): 5000 INJECTION INTRAVENOUS; SUBCUTANEOUS at 13:08

## 2023-01-01 RX ADMIN — PROPOFOL 6.02 MICROGRAM(S)/KG/MIN: 10 INJECTION, EMULSION INTRAVENOUS at 11:13

## 2023-01-01 RX ADMIN — Medication 100 MILLIGRAM(S): at 05:09

## 2023-01-01 RX ADMIN — LEVETIRACETAM 420 MILLIGRAM(S): 250 TABLET, FILM COATED ORAL at 06:42

## 2023-01-01 RX ADMIN — Medication 3 MILLILITER(S): at 17:24

## 2023-01-01 RX ADMIN — Medication 20 MILLIGRAM(S): at 05:10

## 2023-01-01 RX ADMIN — CHLORHEXIDINE GLUCONATE 1 APPLICATION(S): 213 SOLUTION TOPICAL at 05:52

## 2023-01-01 RX ADMIN — FENTANYL CITRATE 50 MICROGRAM(S): 50 INJECTION INTRAVENOUS at 15:52

## 2023-01-01 RX ADMIN — Medication 3 MILLILITER(S): at 11:14

## 2023-01-01 RX ADMIN — Medication 600 MILLIGRAM(S): at 05:09

## 2023-01-01 RX ADMIN — Medication 9.53 MICROGRAM(S)/KG/MIN: at 21:35

## 2023-01-01 RX ADMIN — CHLORHEXIDINE GLUCONATE 15 MILLILITER(S): 213 SOLUTION TOPICAL at 06:41

## 2023-01-01 RX ADMIN — Medication 8: at 11:25

## 2023-01-01 RX ADMIN — Medication 81 MILLIGRAM(S): at 11:13

## 2023-01-01 RX ADMIN — CEFTRIAXONE 100 MILLIGRAM(S): 500 INJECTION, POWDER, FOR SOLUTION INTRAMUSCULAR; INTRAVENOUS at 15:17

## 2023-01-01 RX ADMIN — PROPOFOL 6.02 MICROGRAM(S)/KG/MIN: 10 INJECTION, EMULSION INTRAVENOUS at 13:41

## 2023-01-01 RX ADMIN — PROPOFOL 6.02 MICROGRAM(S)/KG/MIN: 10 INJECTION, EMULSION INTRAVENOUS at 15:15

## 2023-01-01 RX ADMIN — HEPARIN SODIUM 5000 UNIT(S): 5000 INJECTION INTRAVENOUS; SUBCUTANEOUS at 06:23

## 2023-01-01 RX ADMIN — SIMVASTATIN 20 MILLIGRAM(S): 20 TABLET, FILM COATED ORAL at 22:44

## 2023-01-01 RX ADMIN — FENTANYL CITRATE 50 MICROGRAM(S): 50 INJECTION INTRAVENOUS at 15:37

## 2023-01-01 RX ADMIN — PROPOFOL 6.02 MICROGRAM(S)/KG/MIN: 10 INJECTION, EMULSION INTRAVENOUS at 21:35

## 2023-01-01 RX ADMIN — SODIUM CHLORIDE 1000 MILLILITER(S): 9 INJECTION INTRAMUSCULAR; INTRAVENOUS; SUBCUTANEOUS at 16:17

## 2023-01-01 RX ADMIN — Medication 400 MILLIGRAM(S): at 13:08

## 2023-01-01 RX ADMIN — Medication 3 MILLILITER(S): at 23:21

## 2023-01-01 RX ADMIN — Medication 81 MILLIGRAM(S): at 12:42

## 2023-01-01 RX ADMIN — CEFTRIAXONE 100 MILLIGRAM(S): 500 INJECTION, POWDER, FOR SOLUTION INTRAMUSCULAR; INTRAVENOUS at 13:08

## 2023-01-01 RX ADMIN — SIMVASTATIN 20 MILLIGRAM(S): 20 TABLET, FILM COATED ORAL at 23:29

## 2023-01-01 RX ADMIN — PROPOFOL 6.02 MICROGRAM(S)/KG/MIN: 10 INJECTION, EMULSION INTRAVENOUS at 08:45

## 2023-01-01 RX ADMIN — PROPOFOL 6.02 MICROGRAM(S)/KG/MIN: 10 INJECTION, EMULSION INTRAVENOUS at 11:40

## 2023-01-01 RX ADMIN — PROPOFOL 6.02 MICROGRAM(S)/KG/MIN: 10 INJECTION, EMULSION INTRAVENOUS at 03:23

## 2023-03-13 NOTE — ED PROVIDER NOTE - CLINICAL SUMMARY MEDICAL DECISION MAKING FREE TEXT BOX
87-year-old female with history of cardiomyopathy and a systolic murmur in the setting of syncope, concern for critical aortic stenosis, will send cardiac enzymes, screening chest x-ray admitted to telemetry.  Monitoring follow-up echo and cardiology evaluation

## 2023-03-13 NOTE — ED ADULT NURSE REASSESSMENT NOTE - NS ED NURSE REASSESS COMMENT FT1
Pt is resting comfortably in bed, no acute distress noted. Pt given labetolol as per order by MD, BP has come down from 207/79 to 168/53. Pt updated about plan of care, will continue to monitor.
Pt resting comfortably in bed, denies any pain or discomfort. Awaiting transport to . Pt given nourishment and updated regarding plan of care.
Safety checks compld + maintd. T+P Q encouraged. IV sites checked Q2+remains WDL. meds given as ord with no s/s of adverse RXNs. skin care and complete care rendered. Fall +skin precs in place. Call bell within reach and safety measures in place.

## 2023-03-13 NOTE — H&P ADULT - PROBLEM SELECTOR PLAN 1
Patient nearly passed out, no presyncopal events  -patient with cardiac risk factors, elevated troponin (albeit with eric), and new murmur and thus crucial to r/o cardiac syncope  -TTE ordered  -consider cardiology in the AM  -PT/OT consulted  -orthostatics ordered  -troponin already downtrending  -BNP elevated but no s/s of HF  -c/w asa 81 (home med)

## 2023-03-13 NOTE — H&P ADULT - ASSESSMENT
87-year-old female with w/ PMH HTN, HLD, cardiomyopathy who presents to the ED for near syncope, also found to have cystitis, admitted for further workup.

## 2023-03-13 NOTE — H&P ADULT - NSHPREVIEWOFSYSTEMS_GEN_ALL_CORE
CONSTITUTIONAL/GENERAL: No weakness, fevers or chills  EYES/OPHTHALMOLOGIC: No visual changes, No blurry vision, No vertigo   ENMT: No throat pain, or neck stiffness   RESPIRATORY/THORAX: No cough, wheezing, hemoptysis; No shortness of breath  CARDIOVASCULAR: No chest pain or palpitations  GASTROINTESTINAL: No abdominal or epigastric pain. No nausea, vomiting, or hematemesis; No diarrhea or constipation. No melena or hematochezia. endorses suprapubic discomfort   GENITOURINARY: increased urinary frequency, and slight discomfort when urinating but no over burning with urination.   NEUROLOGICAL: No numbness or weakness  SKIN: No itching, rashes  ENDOCRINOLOGY: no heat intolerance, no cold intolerance, no weight gain, no weight loss  PSYCHIATRIC:  no si/no hi, mood stable, no anxiety  MUSCULOSKELETAL SYSTEM:  no joint pain, no myalgias, no arthralgias, no joint swelling

## 2023-03-13 NOTE — H&P ADULT - HISTORY OF PRESENT ILLNESS
Patient is a very pleasant 87-year-old female with w/ PMH HTN, HLD, cardiomyopathy who presents to the ED for near syncope. She states that this morning she was walking down to her kitchen, and felt very "whoozy" and nearly passed out. She states she was able to catch herself and sit down and did not lose any consciousness. She endorses that she has not been eating much, but has been urinating a lot more than normal, and has been having discomfort at her suprapubic area. She denies any lightheaded-ness, any nausea, vomiting, chest pain prior to the near syncopal episode. Endorses that this has happened once before in 2years ago. Denies any diarrhea, headaches, visual changes.

## 2023-03-13 NOTE — ED CLERICAL - BED REQUESTED
13-Mar-2023 18:13 [JVD] : no jugular venous distention  [Normal Thyroid] : the thyroid was normal [Normal Breath Sounds] : Normal breath sounds [Respiratory Effort] : normal respiratory effort [Normal Heart Sounds] : normal heart sounds [Normal Rate and Rhythm] : normal rate and rhythm [0] : right 0 [1+] : left 1+ [2+] : left 2+ [Ankle Swelling (On Exam)] : present [Ankle Swelling Bilaterally] : bilaterally  [] : bilaterally [Ankle Swelling On The Right] : mild [Purpura] : no purpura  [Petechiae] : no petechiae [Skin Ulcer] : no ulcer [Skin Induration] : no induration [Alert] : alert [Oriented to Person] : oriented to person [Oriented to Place] : oriented to place [Oriented to Time] : oriented to time [Calm] : calm [de-identified] : Well-nourished, NAD [de-identified] : NC/AT [de-identified] : Supple  [de-identified] : Large pannus [de-identified] : FROM in LE b/l, strength 5/5 x 4. [de-identified] : L Lateral tibia: 0.5 x 0.5 cm clean, no active drainage, no signs of infection.

## 2023-03-13 NOTE — H&P ADULT - PROBLEM SELECTOR PLAN 3
Likely pre-renal 2/2 decreased po intake  -s/p IVF  -encourage po intake  -strict Is/Os  -avoid nephrotoxic agents - holding valsartan at this time  -renally dose all medications

## 2023-03-13 NOTE — ED ADULT NURSE NOTE - OBJECTIVE STATEMENT
Pt is an 87y female AOx4 c/o syncope. Pt says she started feeling dizzy and lightheaded this morning while making breakfast. Pt did not syncopize at home, daughter helped her to chair first. EMS reported syncopal episode in ambulance but patient does not remember. Pt says this has happened before 3 years ago, unknown cause. Denies pain, headache, SOB, N/V/D. Pt states she has been feeling urinary urgency since last night. Hx osteoarthritis, htn, hld

## 2023-03-13 NOTE — H&P ADULT - NSHPLABSRESULTS_GEN_ALL_CORE
< from: CT Head No Cont (03.13.23 @ 19:47) >    IMPRESSION:    No acute intracranial findings.    --- End of Report ---    < end of copied text >    EKG personally reviewed: NSR, twave inversion in lateral leads, LVH present qtc 455    Labs personally reviewed below:

## 2023-03-13 NOTE — H&P ADULT - NSHPSOCIALHISTORY_GEN_ALL_CORE
Tobacco Use: denies any tobacco use  Alcohol Use: denies etoh intake  Drug use: denies marijuana, cocaine, heroine and other illicit drug use

## 2023-03-13 NOTE — H&P ADULT - NSHPPHYSICALEXAM_GEN_ALL_CORE
Vital Signs Last 24 Hrs  T(C): 36.8 (13 Mar 2023 21:12), Max: 36.8 (13 Mar 2023 13:46)  T(F): 98.2 (13 Mar 2023 21:12), Max: 98.3 (13 Mar 2023 13:46)  HR: 63 (13 Mar 2023 21:12) (62 - 72)  BP: 181/66 (13 Mar 2023 21:12) (157/69 - 190/70)  BP(mean): --  RR: 14 (13 Mar 2023 21:12) (14 - 18)  SpO2: 96% (13 Mar 2023 21:12) (95% - 98%)    Parameters below as of 13 Mar 2023 21:12  Patient On (Oxygen Delivery Method): room air

## 2023-03-13 NOTE — ED ADULT TRIAGE NOTE - CHIEF COMPLAINT QUOTE
Patient had near syncope earlier today, and then had syncopal episode, witnessed by EMS, lasting appx 30 seconds.    Patient states she hasn't eaten since last night.

## 2023-03-13 NOTE — H&P ADULT - PROBLEM SELECTOR PLAN 6
diet: regular  dvt ppx: hsq   dispo: pending further cardiac workup    Jany Torres MD  [facility] Division of [facility]  Available via MS Teams  Pager: [phone]

## 2023-03-13 NOTE — ED PROVIDER NOTE - PHYSICAL EXAMINATION
VITAL SIGNS: I have reviewed nursing notes and confirm.  CONSTITUTIONAL: well-appearing, non-toxic, NAD  SKIN: Warm dry, normal skin turgor  HEAD: NCAT  EYES: EOMI, PERRLA, no scleral icterus  ENT: Moist mucous membranes, normal pharynx   NECK: Supple; non tender. Full ROM.   CARD: RRR, holosystolic murmur, no rubs or gallops  RESP: clear to ausculation b/l.  No rales, rhonchi, or wheezing.  ABD: soft, + BS, non-tender, non-distended, no rebound or guarding. No CVA tenderness  EXT: Full ROM, no bony tenderness, no pedal edema, no calf tenderness  NEURO: normal motor. normal sensory. CN II-XII intact. Cerebellar testing normal. Normal gait.  PSYCH: Cooperative, appropriate.

## 2023-03-13 NOTE — H&P ADULT - PROBLEM SELECTOR PLAN 2
UA + for leuk esterase w/ wbcs, bacteria and +dysuria/urinary frequency  -started on ceftriaxone ( 3/13- )  -f/u UCx

## 2023-03-13 NOTE — ED PROVIDER NOTE - OBJECTIVE STATEMENT
87-year-old female with past medical history of hypertension cardiomyopathy presenting to the ED with near syncopal episode reported earlier today when she walked down a flight of stairs and went to the kitchen felt like she was going to pass out.  Patient denies any chest pain shortness of breath, denies any anticoagulation use, denies any history of PE DVT, patient states known history of previous murmur had echo in 2021.  Patient reports has not been hydrating as much.

## 2023-03-16 NOTE — DISCHARGE NOTE PROVIDER - NSDCFUSCHEDAPPT_GEN_ALL_CORE_FT
GINGER WALTER  [facility] Physician [facility]  [address]ALM [address]  Scheduled Appointment: 03/27/2023    Goldberg, Naomi  [facility]  [address] [facility]  Scheduled Appointment: 05/03/2023

## 2023-03-16 NOTE — DISCHARGE NOTE PROVIDER - NSDCMRMEDTOKEN_GEN_ALL_CORE_FT
Aspir 81 oral delayed release tablet: 1 tab(s) orally once a day  metoprolol succinate 25 mg oral tablet, extended release: 1 tab(s) orally once a day  Norvasc 5 mg oral tablet: 1 tab(s) orally once a day  Pyridium 100 mg oral tablet: 2 tab(s) orally 3 times a day (after meals)   simvastatin 20 mg oral tablet: 1 tab(s) orally once a day (at bedtime)  valsartan 320 mg oral tablet: 1 tab(s) orally once a day   Aspir 81 oral delayed release tablet: 1 tab(s) orally once a day  labetalol 100 mg oral tablet: 1 tab(s) orally 2 times a day  Norvasc 5 mg oral tablet: 1 tab(s) orally once a day  Pyridium 100 mg oral tablet: 2 tab(s) orally 3 times a day (after meals)   simvastatin 20 mg oral tablet: 1 tab(s) orally once a day (at bedtime)  valsartan 320 mg oral tablet: 1 tab(s) orally once a day

## 2023-03-16 NOTE — DISCHARGE NOTE PROVIDER - ATTENDING DISCHARGE PHYSICAL EXAMINATION:
.  VITAL SIGNS:  T(C): 36.7 (03-16-23 @ 11:06), Max: 36.8 (03-15-23 @ 16:50)  T(F): 98.1 (03-16-23 @ 11:06), Max: 98.3 (03-16-23 @ 04:37)  HR: 77 (03-16-23 @ 11:20) (63 - 81)  BP: 170/73 (03-16-23 @ 11:20) (149/69 - 189/78)  BP(mean): --  RR: 18 (03-16-23 @ 11:20) (18 - 18)  SpO2: 98% (03-16-23 @ 11:20) (95% - 99%)  Wt(kg): --    PHYSICAL EXAM:    Patient On (Oxygen Delivery Method): room air      CONSTITUTIONAL: NAD,  ENMT: Moist oral mucosa, no pharyngeal injection or exudates;   RESPIRATORY: Normal respiratory effort; lungs are clear to auscultation bilaterally  CARDIOVASCULAR: Regular rate and rhythm, normal S1 and S2, systolic  murmur; No lower extremity edema;  ABDOMEN: Nontender to palpation, normoactive bowel sounds, no rebound/guarding;  PSYCH: A+O to person, place, and time; affect appropriate  NEUROLOGY: CN 2-12 are intact and symmetric; no gross sensory deficits   SKIN: No rashes; no palpable lesions

## 2023-03-16 NOTE — DISCHARGE NOTE PROVIDER - NSDCCPCAREPLAN_GEN_ALL_CORE_FT
PRINCIPAL DISCHARGE DIAGNOSIS  Diagnosis: Aortic stenosis  Assessment and Plan of Treatment: You have a condtion called aortic stenosis which is a narrowing of your aortic valve.  If  you get dehydrated the amount of volume going to the valve is reduced and you can pass out.   Continue hydration and follow up with the cardiologist of your choice and avoid dehydration as the warmer months are around the corner. Please take your meds as advised      SECONDARY DISCHARGE DIAGNOSES  Diagnosis: Cystitis  Assessment and Plan of Treatment: You were treated with 3 days atb here , will be DC on 2 days of pyridium which is an analgesic via urinary tract , do not worry of your urine turns orange is the medication.    Diagnosis: Aortic stenosis  Assessment and Plan of Treatment:

## 2023-03-16 NOTE — PHYSICAL THERAPY INITIAL EVALUATION ADULT - NSPTDISCHREC_GEN_A_CORE
Airway    General Information and Staff    CRNA: Ck Andujar CRNA    Indications and Patient Condition  Indications for airway management: airway protection    Preoxygenated: yes  MILS not maintained throughout  Mask difficulty assessment: 0 - not attempted    Final Airway Details  Final airway type: endotracheal airway      Successful airway: ETT  Cuffed: yes   Successful intubation technique: direct laryngoscopy  ETT size (mm): 6.5  Cormack-Lehane Classification: grade I - full view of glottis  Placement verified by: chest auscultation and capnometry   Cuff volume (mL): 6  Measured from: lips  ETT to lips (cm): 20               Home PT

## 2023-03-16 NOTE — OCCUPATIONAL THERAPY INITIAL EVALUATION ADULT - GENERAL OBSERVATIONS, REHAB EVAL
Pt was encountered sitting at edge of bed with PT Johny present; NAD, primafit +, heplock +, cardiac monitor +, AXOX3, followed commands, cooperative; pt had no c/o pain.

## 2023-03-16 NOTE — OCCUPATIONAL THERAPY INITIAL EVALUATION ADULT - PERTINENT HX OF CURRENT PROBLEM, REHAB EVAL
87-year-old female with w/ PMH HTN, HLD, cardiomyopathy who presents to the ED for near syncope, also found to have cystitis. CT of the head on 3/13/23 revealed No acute intracranial findings.

## 2023-03-16 NOTE — PHYSICAL THERAPY INITIAL EVALUATION ADULT - GAIT TRAINING, PT EVAL
Pt will ambulate 200 feet with rolling walker or rollator, and negotiate one flight of stairs c R rail up, step to gait,  independently, without loss of balance, by 2 weeks.

## 2023-03-16 NOTE — DISCHARGE NOTE PROVIDER - HOSPITAL COURSE
87-year-old female with w/ PMH HTN, HLD, cardiomyopathy who presents to the ED for near syncope. She states that this morning she was walking down to her kitchen, and felt very "whoozy" and nearly passed out. She states she was able to catch herself and sit down and did not lose any consciousness. She endorses that she has not been eating much, but has been urinating a lot more than normal, and has been having discomfort at her suprapubic area. She denies any lightheaded-ness, any nausea, vomiting, chest pain prior to the near syncopal episode. Endorses that this has happened once before in 2years ago. Denies any diarrhea, headaches, visual changes.   Patient repeat TTE- Summary:   1. Left ventricular ejection fraction, by visual estimation, is 60 to   65%.   2. Technically adequate study.   3. Normal global left ventricular systolic function.   4. Normal left ventricular internal cavity size.   5. Spectral Doppler shows pseudonormal pattern of left ventricular   myocardial filling (Grade II diastolic dysfunction).   6. Normal right ventricular size and function.   7. Mildly enlarged left atrium.   8. Normal right atrial size.   9. There is no evidence of pericardial effusion.  10. Mild thickening and calcification of the anterior and posterior   mitral valve leaflets.  11. Moderate mitral annular calcification.  12. Trace mitral valve regurgitation.  13. Mild tricuspid regurgitation.  14. Mild to moderate aortic regurgitation.  15. Moderate to severe aortic valve stenosis.  16. Mild pulmonic valve regurgitation.        On admission patient had a decreased preload as UTI and not drinking  enough fluids , GAUTAM .   She completed 3 days antibiotics and received IVF with clinical improvement .   D/w family they will make appointment with cardiology /interventional cardiology at [facility] .most likely.

## 2023-03-16 NOTE — PHYSICAL THERAPY INITIAL EVALUATION ADULT - PERTINENT HX OF CURRENT PROBLEM, REHAB EVAL
Patient is a very pleasant 87-year-old female with w/ PMH HTN, HLD, cardiomyopathy who presents to the ED for near syncope. She states that this morning she was walking down to her kitchen, and felt very "whoozy" and nearly passed out. She states she was able to catch herself and sit down and did not lose any consciousness. She endorses that she has not been eating much, but has been urinating a lot more than normal, and has been having discomfort at her suprapubic area. She denies any lightheaded-ness, any nausea, vomiting, chest pain prior to the near syncopal episode. Endorses that this has happened once before in 2years ago. Denies any diarrhea, headaches, visual changes. Pt was admitted c Dx of near syncope, UTI, and Aortic stenosis.

## 2023-03-16 NOTE — PHYSICAL THERAPY INITIAL EVALUATION ADULT - ADDITIONAL COMMENTS
As per pt : There are 4 steps, c R rail up, at the entry of the house and one flight of steps, c R rail up, to negotiate at home. Pt owns a rollator, rolling walker, and 3-1 commode and they are in good working condition. Pt ambulated c a rollator prior to admission. Pt is a community ambulator.

## 2023-03-16 NOTE — OCCUPATIONAL THERAPY INITIAL EVALUATION ADULT - ADDITIONAL COMMENTS
As per pt, pt lives with family (Who can assist) in a private house which has 4 steps with a R rail up at the entry of the house. Inside of the house, the pt has one flight of steps with a R rail up to negotiate at home. Pt owns a rollator, rolling walker, and 3-1 commode and they are in good working condition. Pt ambulated with a rollator prior to admission. Pt is a community ambulator

## 2023-03-16 NOTE — PHYSICAL THERAPY INITIAL EVALUATION ADULT - GENERAL OBSERVATIONS, REHAB EVAL
Pt was seen sitting at the edge of bed c cardiac monitor + and Primafit +, alert and Ox4. Pt was comfortable and motivated.

## 2023-03-17 NOTE — PROGRESS NOTE ADULT - PROBLEM SELECTOR PLAN 2
UA + for leuk esterase w/ wbcs, bacteria and +dysuria/urinary frequency  -started on ceftriaxone ( 3/13- 3/16)  -f/u UCx  DC on pyridium
UA + for leuk esterase w/ wbcs, bacteria and +dysuria/urinary frequency  -started on ceftriaxone ( 3/13- 3/16)  -f/u UCx

## 2023-03-17 NOTE — DISCHARGE NOTE NURSING/CASE MANAGEMENT/SOCIAL WORK - PATIENT PORTAL LINK FT
You can access the FollowMyHealth Patient Portal offered by [facility] by registering at the following website: [email or web address]. By joining [facility]’s FollowMyHealth portal, you will also be able to view your health information using other applications (apps) compatible with our system.

## 2023-03-17 NOTE — PROGRESS NOTE ADULT - PROBLEM SELECTOR PLAN 6
diet: regular  dvt ppx: hsq   dispo: pending medical optimization   D/w daughters at bedside
diet: regular  dvt ppx: hsq   dispo: stable for DC   D/w daughters at bedside  DC summary updated 3/17/23

## 2023-03-17 NOTE — PROGRESS NOTE ADULT - SUBJECTIVE AND OBJECTIVE BOX
Medicine Progress Note    Patient is a 87y old  Female who presents with a chief complaint of near syncope and uti (13 Mar 2023 23:01)      SUBJECTIVE / OVERNIGHT EVENTS: patient feeling better , completing ATB D3         MEDICATIONS  (STANDING):  amLODIPine   Tablet 5 milliGRAM(s) Oral daily  aspirin enteric coated 81 milliGRAM(s) Oral daily  cefTRIAXone   IVPB      cefTRIAXone   IVPB 1000 milliGRAM(s) IV Intermittent every 24 hours  heparin   Injectable 5000 Unit(s) SubCutaneous every 12 hours  influenza  Vaccine (HIGH DOSE) 0.7 milliLiter(s) IntraMuscular once  metoprolol succinate ER 25 milliGRAM(s) Oral daily  simvastatin 20 milliGRAM(s) Oral at bedtime    MEDICATIONS  (PRN):    CAPILLARY BLOOD GLUCOSE        I&O's Summary    14 Mar 2023 07:01  -  15 Mar 2023 07:00  --------------------------------------------------------  IN: 826 mL / OUT: 750 mL / NET: 76 mL    15 Mar 2023 07:01  -  15 Mar 2023 16:37  --------------------------------------------------------  IN: 0 mL / OUT: 500 mL / NET: -500 mL        PHYSICAL EXAM:  Vital Signs Last 24 Hrs  T(C): 37.1 (15 Mar 2023 11:01), Max: 37.1 (15 Mar 2023 04:34)  T(F): 98.8 (15 Mar 2023 11:01), Max: 98.8 (15 Mar 2023 04:34)  HR: 76 (15 Mar 2023 11:) (68 - 76)  BP: 145/72 (15 Mar 2023 11:) (145/72 - 162/73)  BP(mean): --  RR: 18 (15 Mar 2023 11:) (18 - 18)  SpO2: 97% (15 Mar 2023 11:) (95% - 98%)    Parameters below as of 15 Mar 2023 11:  Patient On (Oxygen Delivery Method): room air      CONSTITUTIONAL: NAD,  ENMT: Moist oral mucosa, no pharyngeal injection or exudates;   RESPIRATORY: Normal respiratory effort; lungs are clear to auscultation bilaterally  CARDIOVASCULAR: Regular rate and rhythm, normal S1 and S2, systolic  murmur; No lower extremity edema;  ABDOMEN: Nontender to palpation, normoactive bowel sounds, no rebound/guarding;  PSYCH: A+O to person, place, and time; affect appropriate  NEUROLOGY: CN 2-12 are intact and symmetric; no gross sensory deficits   SKIN: No rashes; no palpable lesions    LABS:                        10.7   6.52  )-----------( 190      ( 15 Mar 2023 06:10 )             34.5     03-15    138  |  108  |  40<H>  ----------------------------<  94  4.3   |  23  |  1.71<H>    Ca    8.9      15 Mar 2023 06:10  Phos  4.4     03-15  Mg     2.4     03-15            Urinalysis Basic - ( 13 Mar 2023 18:27 )    Color: Yellow / Appearance: Slightly Turbid / S.005 / pH: x  Gluc: x / Ketone: Negative  / Bili: Negative / Urobili: Negative mg/dL   Blood: x / Protein: 100 mg/dL / Nitrite: Negative   Leuk Esterase: Trace / RBC: 25-50 /HPF / WBC 6-10   Sq Epi: x / Non Sq Epi: Moderate / Bacteria: Moderate            RADIOLOGY & ADDITIONAL TESTS:  Imaging from Last 24 Hours:    Electrocardiogram/QTc Interval:    COORDINATION OF CARE:  Care Discussed with Consultants/Other Providers:  
Medicine Progress Note    Patient is a 87y old  Female who presents with a chief complaint of near syncope and uti (16 Mar 2023 15:33)      SUBJECTIVE / OVERNIGHT EVENTS: no events , BP better controlled today      MEDICATIONS  (STANDING):  amLODIPine   Tablet 5 milliGRAM(s) Oral daily  aspirin enteric coated 81 milliGRAM(s) Oral daily  heparin   Injectable 5000 Unit(s) SubCutaneous every 12 hours  influenza  Vaccine (HIGH DOSE) 0.7 milliLiter(s) IntraMuscular once  labetalol 100 milliGRAM(s) Oral two times a day  melatonin 3 milliGRAM(s) Oral at bedtime  simvastatin 20 milliGRAM(s) Oral at bedtime    MEDICATIONS  (PRN):  acetaminophen     Tablet .. 650 milliGRAM(s) Oral every 6 hours PRN Moderate Pain (4 - 6)    CAPILLARY BLOOD GLUCOSE        I&O's Summary    16 Mar 2023 07:01  -  17 Mar 2023 07:00  --------------------------------------------------------  IN: 0 mL / OUT: 1500 mL / NET: -1500 mL    17 Mar 2023 07:01  -  17 Mar 2023 20:57  --------------------------------------------------------  IN: 277 mL / OUT: 0 mL / NET: 277 mL        PHYSICAL EXAM:  Vital Signs Last 24 Hrs  T(C): 36.7 (17 Mar 2023 10:35), Max: 36.7 (17 Mar 2023 10:35)  T(F): 98 (17 Mar 2023 10:35), Max: 98 (17 Mar 2023 10:35)  HR: 79 (17 Mar 2023 10:35) (71 - 79)  BP: 139/69 (17 Mar 2023 10:35) (138/67 - 180/70)  BP(mean): --  RR: 19 (17 Mar 2023 10:35) (18 - 19)  SpO2: 96% (17 Mar 2023 10:35) (96% - 98%)    Parameters below as of 17 Mar 2023 10:35  Patient On (Oxygen Delivery Method): room air      CONSTITUTIONAL: NAD,   ENMT: Moist oral mucosa, no pharyngeal injection or exudates;   RESPIRATORY: Normal respiratory effort; lungs are clear to auscultation bilaterally  CARDIOVASCULAR: Regular rate and rhythm, normal S1 and S2, systolic  murmur, No lower extremity edema;   ABDOMEN: Nontender to palpation, normoactive bowel sounds, no rebound/guarding;   PSYCH: A+O to person, place, and time; affect appropriate  NEUROLOGY: CN 2-12 are intact and symmetric; no gross sensory deficits   SKIN: No rashes; no palpable lesions    LABS:                        10.6   7.53  )-----------( 185      ( 16 Mar 2023 06:35 )             33.3     03-16    136  |  109<H>  |  39<H>  ----------------------------<  99  4.2   |  21<L>  |  1.42<H>    Ca    8.9      16 Mar 2023 06:35    TPro  6.8  /  Alb  2.9<L>  /  TBili  0.3  /  DBili  x   /  AST  20  /  ALT  13  /  AlkPhos  75  03-16                  RADIOLOGY & ADDITIONAL TESTS:  Imaging from Last 24 Hours:    Electrocardiogram/QTc Interval:    COORDINATION OF CARE:  Care Discussed with Consultants/Other Providers:

## 2023-03-17 NOTE — PROGRESS NOTE ADULT - PROBLEM SELECTOR PLAN 1
Patient nearly passed out, no presyncopal events  -patient with cardiac risk factors, elevated troponin (albeit with eric), and new murmur and thus crucial to r/o cardiac syncope  -TTE - moderate / severe AS    -will need cardio eval outpatient for TAVR ,  -PT/OT consulted  -orthostatics ordered  -troponin already downtrending  -BNP elevated but no s/s of HF  -c/w asa 81 (home med)
Patient nearly passed out, no presyncopal events  -patient with cardiac risk factors, elevated troponin (albeit with eric), and new murmur and thus crucial to r/o cardiac syncope  -TTE - moderate / severe AS    -will need cardio eval outpatient for TAVR ,  -PT/OT consulted  -orthostatics ordered  -troponin already downtrending  -BNP elevated but no s/s of HF  -c/w asa 81 (home med)  Family will follow up with cardio at [facility]

## 2023-03-17 NOTE — DISCHARGE NOTE NURSING/CASE MANAGEMENT/SOCIAL WORK - NSDCPEFALRISK_GEN_ALL_CORE
For information on Fall & Injury Prevention, visit: [email or web address] OR  [email or web address] OR  https://www.cdc.gov/steadi/patient.html

## 2023-03-17 NOTE — PROGRESS NOTE ADULT - PROBLEM SELECTOR PLAN 3
Likely pre-renal 2/2 decreased po intake  -s/p IVF  -encourage po intake  -strict Is/Os  -avoid nephrotoxic agents - holding valsartan at this time  -renally dose all medications  f/w labs in am
Likely pre-renal 2/2 decreased po intake  -s/p IVF  -encourage po intake  -strict Is/Os  -avoid nephrotoxic agents - holding valsartan at this time  -renally dose all medications  f/w labs in am

## 2023-03-17 NOTE — PROGRESS NOTE ADULT - PROBLEM SELECTOR PLAN 4
c/w meoprolol 25 qD + norvasc 5 qD  -holding valsartan 320 qD i/s/o elevated ScR  Acceptable BP
hold  meoprolol 25 qD , add Labetalol for BP control+ norvasc 5 qD  -resume  valsartan 320 qD i/s/o elevated ScR  Acceptable BP for DC today   Meds adjusted

## 2023-07-19 PROBLEM — I10 ESSENTIAL (PRIMARY) HYPERTENSION: Chronic | Status: ACTIVE | Noted: 2023-01-01

## 2023-07-19 PROBLEM — Z86.79 PERSONAL HISTORY OF OTHER DISEASES OF THE CIRCULATORY SYSTEM: Chronic | Status: ACTIVE | Noted: 2023-01-01

## 2023-09-08 NOTE — DISCHARGE NOTE PROVIDER - NSTOBACCOUSAGEY/N_GEN_A_CS
Patient calling to report that he found his xarelto bottle on the floor but it was empty. He says that he does have enough in his pillbox to make it until 9/12. Patient asking if he needs to spend $500 for a refill now because insurance will only refill on 9/19.     Informed patient would discuss with pharmacy and then call him back. $500 may be for a 30 days supply and he should only need to buy 7 days worth until he can get a refill through insurance.     No

## 2023-12-07 NOTE — ED PROVIDER NOTE - CLINICAL SUMMARY MEDICAL DECISION MAKING FREE TEXT BOX
87F PMH HTN BIBEMS accompanied by granddaughter d/t SOB x2 days a/w cough w/ white phlegm. Afebrile. + tachycardic, tachypneic on ED arrival. SPO2 85-97% RA. + wheezing. Plan for Duoneb, mag, steroids +/- BiPAP. Send CBC, CMP, lactate, trop, BNP, D-dimer, RVP, BCs, CXR. Re-eval. 87F PMH HTN BIBEMS accompanied by granddaughter d/t SOB x4 days a/w cough w/ white phlegm. Rectal T103, + tachycardic, tachypneic on ED arrival. SPO2 90% RA. + wheezing. Plan for antipyretics, IVF, empiric IV abx, Duoneb, mag, steroids +/- BiPAP. Send CBC, CMP, VBG, lactate, trop, BNP, D-dimer, RVP, BCs, CXR. Re-eval. 87F PMH HTN BIBEMS accompanied by granddaughter d/t SOB x4 days a/w cough w/ white phlegm. Rectal T103, + tachycardic, tachypneic on ED arrival. SPO2 90% RA. + wheezing. Plan for antipyretics, IVF, empiric IV abx - Code Sepsis, plus Duoneb, mag, steroids +/- BiPAP. Send CBC, CMP, VBG, lactate, trop, BNP, D-dimer, RVP, BCs, CXR. Re-eval.  W/u significant for: RVP (+) RSV, WBC 11.6, Cr 2.15 (elevated from prior values w/in EMR), + trop 270 / BNP 80448 / D-dimer 760, will add on CTA chest. CT negative for PE, + MF PNA.  Will admit to Tele (d/w Dr Seo). Pt, family updated to results of ED w/u, admission. They understand / agree w/ this plan. 87F PMH HTN BIBEMS accompanied by granddaughter d/t SOB x4 days a/w cough w/ white phlegm. Rectal T103, + tachycardic, tachypneic on ED arrival. SPO2 90% RA. + wheezing. Plan for antipyretics, IVF, empiric IV abx - Code Sepsis, plus Duoneb, mag, steroids +/- BiPAP. Send CBC, CMP, VBG, lactate, trop, BNP, D-dimer, RVP, BCs, CXR. Re-eval.  W/u significant for: RVP (+) RSV, WBC 11.6, Cr 2.15 (elevated from prior values w/in EMR), + trop 270 / BNP 00535 / D-dimer 760, will add on CTA chest. CT negative for PE, + MF PNA.  Will admit to Tele (d/w Dr Seo). Pt, family updated to results of ED w/u, admission. They understand / agree w/ this plan.

## 2023-12-07 NOTE — ED ADULT NURSE NOTE - ED STAT RN HANDOFF DETAILS
Endorsed insulin regimen for blood sugar Pt AOx3, on BIPAP, IVs intact on L Av and R forearm 20G. Endorsed insulin regimen for blood sugar. Safety maintained. Escorted up with respiratory therapist.

## 2023-12-07 NOTE — ED ADULT NURSE NOTE - OBJECTIVE STATEMENT
Pt presents to the ed with ems c/o wheezing and coughing. Pt was noted to have audible wheezes, tachypneic, POLLOCK and dyspnea at rest, coughing up thick white sputum and febrile at 103.5 rectally. Pt reports that family at home has been ill. Pt denies chest pain, dysuria or hematuria. Pt is tachycardiac on cardiac monitor, no peripheral edema noted. Pt has albuterol treatments going. Md currently at bedside. Pt reports generalized weakness. No slurred speech or facial droop was noted. Pt was noted to have excoriation on left inner buttocks. pt had a small bowel movement and was cleaned.

## 2023-12-07 NOTE — ED ADULT TRIAGE NOTE - CHIEF COMPLAINT QUOTE
Patient BIB EMS from with complaints of moist cough for the past 2 days, patient wheezing bilaterally, x2 Duoneb administered by EMS with minimal relieve.  Patient voiced pain to chest when coughing and congestion. PMX: HTN

## 2023-12-07 NOTE — ED ADULT NURSE REASSESSMENT NOTE - NS ED NURSE REASSESS COMMENT FT1
pt resting on stretcher on 4L NC at this time, tolerating well O2 95%, less tachypneic and more comfortable appearing. 2nd bolus infusing, 4 neb treatments given in total. Family remains at bedside, awaiting admission. faint erythema across dorsal aspect of foot

## 2023-12-07 NOTE — H&P ADULT - HISTORY OF PRESENT ILLNESS
87 year old female PMH HTN BIBEMS accompanied by granddaughter c/o SOB x 4 days a/w cough w/ white phlegm. Per granddaughter, other household members with URI symptoms. Per EMS, pt wit audible wheezing - denies hx asthma / COPD - SPO2 80s on RA, improved to 90s s/p breathing treatment x 2. Pt denies CP, LE swelling. Per granddaughter w/o hx lung or heart disease.  87 year old female PMH HTN BIBEMS accompanied by granddaughter c/o SOB x 4 days a/w cough w/ white phlegm. Per granddaughter, other household members with URI symptoms. Per EMS, pt wit audible wheezing - denies hx asthma / COPD - SPO2 80s on RA, improved to 90s s/p breathing treatment x 2. Pt denies CP, LE swelling. Per granddaughter w/o hx lung or heart disease. No prior history of CHF, no prior cardiac stents.

## 2023-12-07 NOTE — H&P ADULT - ASSESSMENT
87 year old female PMH HTN BIBEMS accompanied by granddaughter c/o SOB x 4 days a/w cough w/ white phlegm. Per granddaughter, other household members with URI symptoms. Per EMS, pt wit audible wheezing - denies hx asthma / COPD - SPO2 80s on RA, improved to 90s s/p breathing treatment x 2. Pt denies CP, LE swelling. Per granddaughter w/o hx lung or heart disease.     Plan:   87 year old female PMH HTN BIBEMS accompanied by granddaughter c/o SOB x 4 days a/w cough w/ white phlegm. Per granddaughter, other household members with URI symptoms. Per EMS, pt wit audible wheezing - denies hx asthma / COPD - SPO2 80s on RA, improved to 90s s/p breathing treatment x 2. Pt denies CP, LE swelling. Per granddaughter w/o hx lung or heart disease.       Plan:  Admit for CAP with RVP positive and mild wheeze at rest. CTA no PE, bilateral lower lobe infiltrates. No leg edema.   Doubt CHF. Lactate high on arrival 3.9, down slightly from admission. Stable on NC 3.0 lpm. GAUTAM on arrival with creatinine 2.15, follow trend after IVF. Trop elevated 273, BNP elevated 13,000, no cardiac history. No CP on arrival. Mucinex and Solumedrol  20 mg tid added for wheeze with Duonebs.     Continue home meds of Diovan, Norvasc, Zocur, Labatelol.   87 year old female PMH HTN BIBEMS accompanied by granddaughter c/o SOB x 4 days a/w cough w/ white phlegm. Per granddaughter, other household members with URI symptoms. Per EMS, pt wit audible wheezing - denies hx asthma / COPD - SPO2 80s on RA, improved to 90s s/p breathing treatment x 2. Pt denies CP, LE swelling. Per granddaughter w/o hx lung or heart disease.       Plan:  Admit for CAP with RVP positive and mild wheeze at rest. CTA no PE, bilateral lower lobe infiltrates. No leg edema. Doubt CHF. Lactate high on arrival 3.9, down slightly from arrival. Stable on NC 3.0 lpm. Started on IV Ceftriaxone and Zithromax.      GAUTAM on arrival with creatinine 2.15, follow trend after IVF. No prior for comparison.     Trop elevated 273, BNP elevated 13,000, no cardiac history. No CP on arrival. No prior cardiac stents. Mucinex and Solumedrol 20 mg tid added for wheeze with Duonebs.     Continue home meds of Diovan, Norvasc, Zocur, Labatelol all at home doses.    87 year old female PMH HTN BIBEMS accompanied by granddaughter c/o SOB x 4 days a/w cough w/ white phlegm. Per granddaughter, other household members with URI symptoms. Per EMS, pt wit audible wheezing - denies hx asthma / COPD - SPO2 80s on RA, improved to 90s s/p breathing treatment x 2. Pt denies CP, LE swelling. Per granddaughter w/o hx lung or heart disease.       Plan:  Admit for CAP with RVP positive and mild wheeze at rest. CTA no PE, bilateral lower lobe infiltrates. No leg edema. Doubt CHF. Lactate high on arrival 3.9, down slightly from arrival. Stable on NC 3.0 lpm. Started on IV Ceftriaxone and Zithromax.      GAUTAM on arrival with creatinine 2.15, follow trend after IVF. No prior for comparison.     Trop elevated 273, BNP elevated 13,000, no cardiac history. No CP on arrival. No prior cardiac stents. Mucinex and Solumedrol 20 mg tid added for wheeze with Duonebs.     Continue home meds of Diovan, Norvasc, Zocur, Labatelol all at home doses.         Addendum: ABG later in evening  showed 7.27/44/70/93%. Was placed on BIPAP 12/6/40 %.  Solumedrol increased to 20 mg every 6 hours.   87 year old female PMH HTN BIBEMS accompanied by granddaughter c/o SOB x 4 days a/w cough w/ white phlegm. Per granddaughter, other household members with URI symptoms. Per EMS, pt wit audible wheezing - denies hx asthma / COPD - SPO2 80s on RA, improved to 90s s/p breathing treatment x 2. Pt denies CP, LE swelling. Per granddaughter w/o hx lung or heart disease.       Plan:  Admit for CAP with RVP positive and mild wheeze at rest. CTA no PE, bilateral lower lobe infiltrates. No leg edema. Doubt CHF. Lactate high on arrival 3.9, down slightly from arrival. Stable on NC 3.0 lpm. Started on IV Ceftriaxone and Zithromax.      GAUTAM on arrival with creatinine 2.15, follow trend after IVF. No prior for comparison.     Trop elevated 273, BNP elevated 13,000, no cardiac history. No CP on arrival. No prior cardiac stents. Mucinex and Solumedrol 20 mg tid added for wheeze with Duonebs.     Continue home meds of Diovan, Norvasc, Zocur, Labatelol all at home doses.         Addendum: ABG later in evening  showed 7.27/44/70/93%. Was placed on BIPAP 12/6/40 %.  Solumedrol increased to 20 mg every 6 hours.

## 2023-12-07 NOTE — ED PROVIDER NOTE - OBJECTIVE STATEMENT
87F PMH HTN BIBEMS accompanied by granddaughter d/t SOB x2 days a/w cough w/ white phlegm. Per granddaughter, other household members w/ URI symptoms. Per EMS, pt w/ audible wheezing - denies hx asthma / COPD - SPO2 80s on RA, improved to 90s s/p breathing treatment x2. Pt denies F/C, CP, LE swelling. Per granddaughter w/o hx lung or heart disease. Pt states has not eaten since Sunday.     PMH as above, PSH non-contributory, NKDA (EMR w/ hx anaphylaxis - oxycodone), Meds as listed. 87F PMH HTN BIBEMS accompanied by granddaughter d/t SOB x4 days a/w cough w/ white phlegm. Per granddaughter, other household members w/ URI symptoms. Per EMS, pt w/ audible wheezing - denies hx asthma / COPD - SPO2 80s on RA, improved to 90s s/p breathing treatment x2. Pt denies F/C, CP, LE swelling. Per granddaughter w/o hx lung or heart disease.     PMH as above, PSH non-contributory, NKDA (EMR w/ reported anaphylaxis to oxycodone), Meds as listed. Pt states last saw PMD in November.

## 2023-12-07 NOTE — ED ADULT NURSE NOTE - NSFALLHARMRISKINTERV_ED_ALL_ED
Assistance OOB with selected safe patient handling equipment if applicable/Assistance with ambulation/Communicate risk of Fall with Harm to all staff, patient, and family/Monitor gait and stability/Provide patient with walking aids/Provide visual cue: red socks, yellow wristband, yellow gown, etc/Reinforce activity limits and safety measures with patient and family/Bed in lowest position, wheels locked, appropriate side rails in place/Call bell, personal items and telephone in reach/Instruct patient to call for assistance before getting out of bed/chair/stretcher/Non-slip footwear applied when patient is off stretcher/[place] to call system/Physically safe environment - no spills, clutter or unnecessary equipment/Purposeful Proactive Rounding/Room/bathroom lighting operational, light cord in reach Assistance OOB with selected safe patient handling equipment if applicable/Assistance with ambulation/Communicate risk of Fall with Harm to all staff, patient, and family/Monitor gait and stability/Provide patient with walking aids/Provide visual cue: red socks, yellow wristband, yellow gown, etc/Reinforce activity limits and safety measures with patient and family/Bed in lowest position, wheels locked, appropriate side rails in place/Call bell, personal items and telephone in reach/Instruct patient to call for assistance before getting out of bed/chair/stretcher/Non-slip footwear applied when patient is off stretcher/[place] to call system/Physically safe environment - no spills, clutter or unnecessary equipment/Purposeful Proactive Rounding/Room/bathroom lighting operational, light cord in reach

## 2023-12-07 NOTE — H&P ADULT - NSHPPHYSICALEXAM_GEN_ALL_CORE
PHYSICAL EXAMINATION:  Vital Signs Last 24 Hrs  T(C): 37.2 (07 Dec 2023 15:39), Max: 39.7 (07 Dec 2023 12:55)  T(F): 99 (07 Dec 2023 15:39), Max: 103.5 (07 Dec 2023 12:55)  HR: 100 (07 Dec 2023 15:39) (100 - 110)  BP: 167/74 (07 Dec 2023 15:39) (167/74 - 189/89)  BP(mean): --  RR: 26 (07 Dec 2023 15:39) (22 - 30)  SpO2: 96% (07 Dec 2023 15:39) (95% - 97%)    Parameters below as of 07 Dec 2023 15:39  Patient On (Oxygen Delivery Method): nasal cannula  O2 Flow (L/min): 5    CAPILLARY BLOOD GLUCOSE      POCT Blood Glucose.: 172 mg/dL (07 Dec 2023 12:42)      GENERAL: NAD,   ENMT: mucous membranes moist  NECK: supple, No JVD  CHEST/LUNG: clear to auscultation bilaterally; no rales, rhonchi, or wheezing b/l  HEART: normal S1, S2  ABDOMEN: BS+, soft, ND, NT   EXTREMITIES:  pulses palpable; no clubbing, cyanosis, or edema b/l LEs  NEURO: awake, alert, interactive; moves all extremities PHYSICAL EXAMINATION:  Vital Signs Last 24 Hrs  T(C): 37.2 (07 Dec 2023 15:39), Max: 39.7 (07 Dec 2023 12:55)  T(F): 99 (07 Dec 2023 15:39), Max: 103.5 (07 Dec 2023 12:55)  HR: 100 (07 Dec 2023 15:39) (100 - 110)  BP: 167/74 (07 Dec 2023 15:39) (167/74 - 189/89)  BP(mean): --  RR: 26 (07 Dec 2023 15:39) (22 - 30)  SpO2: 96% (07 Dec 2023 15:39) (95% - 97%)    Parameters below as of 07 Dec 2023 15:39  Patient On (Oxygen Delivery Method): nasal cannula  O2 Flow (L/min): 5    CAPILLARY BLOOD GLUCOSE      POCT Blood Glucose.: 172 mg/dL (07 Dec 2023 12:42)      GENERAL: NAD, seen in ER, stable on NC, mild wheeze at rest, no cough.   ENMT: mucous membranes moist  NECK: supple, No JVD  CHEST/LUNG: mild rhonchi, mild wheezing b/l  HEART: normal S1, S2  ABDOMEN: BS+, soft, ND, NT   EXTREMITIES:  pulses palpable; no clubbing, cyanosis, or edema b/l LEs  NEURO: awake, alert, interactive; moves all extremities

## 2023-12-07 NOTE — PATIENT PROFILE ADULT - FUNCTIONAL SCREEN CURRENT LEVEL: COMMUNICATION, MLM
Please inform patient about medication ordered.     Thanks,   Dr. Gomes
0 = understands/communicates without difficulty

## 2023-12-07 NOTE — PATIENT PROFILE ADULT - FALL HARM RISK - HARM RISK INTERVENTIONS
Assistance with ambulation/Assistance OOB with selected safe patient handling equipment/Communicate Risk of Fall with Harm to all staff/Discuss with provider need for PT consult/Monitor gait and stability/Provide patient with walking aids - walker, cane, crutches/Reinforce activity limits and safety measures with patient and family/Tailored Fall Risk Interventions/Visual Cue: Yellow wristband and red socks/Bed in lowest position, wheels locked, appropriate side rails in place/Call bell, personal items and telephone in reach/Instruct patient to call for assistance before getting out of bed or chair/Non-slip footwear when patient is out of bed/[place] to call system/Physically safe environment - no spills, clutter or unnecessary equipment/Purposeful Proactive Rounding/Room/bathroom lighting operational, light cord in reach Assistance with ambulation/Assistance OOB with selected safe patient handling equipment/Communicate Risk of Fall with Harm to all staff/Discuss with provider need for PT consult/Monitor gait and stability/Provide patient with walking aids - walker, cane, crutches/Reinforce activity limits and safety measures with patient and family/Tailored Fall Risk Interventions/Visual Cue: Yellow wristband and red socks/Bed in lowest position, wheels locked, appropriate side rails in place/Call bell, personal items and telephone in reach/Instruct patient to call for assistance before getting out of bed or chair/Non-slip footwear when patient is out of bed/[place] to call system/Physically safe environment - no spills, clutter or unnecessary equipment/Purposeful Proactive Rounding/Room/bathroom lighting operational, light cord in reach

## 2023-12-07 NOTE — PATIENT PROFILE ADULT - CHOOSE INDICATION TO IMMUNIZE (AN ORDER WILL BE GENERATED WHEN THIS NOTE IS SAVED):
- Multiple right-sided rib fractures (8th-9th), present on admission   - Continue rib fracture protocol   - Continue to encourage incentive spirometer use and adequate pulmonary hygiene  - Continue multimodal analgesic regimen     - Supplemental oxygen via nasal cannula as needed to maintain saturations greater than or equal to 94%  - PT and OT evaluation and treatment as indicated  - Outpatient follow-up in the trauma clinic for re-evaluation in approximately 2 weeks  Patient is not pregnant (male or female)

## 2023-12-07 NOTE — ED ADULT NURSE REASSESSMENT NOTE - NS ED NURSE REASSESS COMMENT FT1
Pt on 6L NC. Patient RR 30 with O2 sat 80s. Pt repositioned with O2 sats improved to the 90s. RSV positive. Internal PA notified. Recommended ABG redraw and high flow oxygen. Plan of care on going.

## 2023-12-07 NOTE — H&P ADULT - NSHPLABSRESULTS_GEN_ALL_CORE
LABS:                        13.0   11.65 )-----------( 157      ( 07 Dec 2023 12:50 )             40.1     12-07    133<L>  |  102  |  37<H>  ----------------------------<  179<H>  3.7   |  19<L>  |  2.15<H>    Ca    8.9      07 Dec 2023 12:50    TPro  8.2  /  Alb  2.8<L>  /  TBili  0.6  /  DBili  x   /  AST  51<H>  /  ALT  26  /  AlkPhos  90  12-07      Urinalysis Basic - ( 07 Dec 2023 16:00 )    Color: Yellow / Appearance: Cloudy / SG: >1.030 / pH: x  Gluc: x / Ketone: Trace mg/dL  / Bili: Negative / Urobili: 1.0 mg/dL   Blood: x / Protein: >=1000 mg/dL / Nitrite: Negative   Leuk Esterase: Negative / RBC: 11-25 /HPF / WBC 0-2 /HPF   Sq Epi: x / Non Sq Epi: x / Bacteria: Moderate /HPF          RADIOLOGY & ADDITIONAL TESTS:

## 2023-12-08 NOTE — CONSULT NOTE ADULT - CRITICAL CARE ATTENDING COMMENT
87 year old female PMH HTN, HLD, and cardiomyopathy BIBEMS accompanied by granddaughter c/o SOB x 4 days a/w cough w/ white phlegm. Pt admitted for RSV w/ superimposed CAP. RRT called this AM after being found unresponsive on BIPAP then code blue called as pt was pulseless. Pt cardiac arrest w/ an approx 16 min downtime with ROSC. Pt admitted to ICU for further management.    Neuro:  Unresponsive likely in the setting of post cardiac arrest   - Baseline pt is A&O x4 as per chart review  - + Brainstem reflexes, not following commands or purposeful movements, w/ ? myoclonus jerking mvt   - STAT EEG f/u results initiate antiepileptics as warranted   - Will consider CT/MRI if mentation does not improve to assess for anoxia   - Sedation: Propofol wean as tolerates, versed IVP PRN for ventilator synchrony and myoclonus   - Will continue to wean off sedation and evaluate mentation    Resp:  RSV PNA requiring BIPAP s/p cardiac arrest with intubation now on ventilator   - Acute hypercapnic/hypoxic respiratory failure likely in the setting of mucus plugging vs superimposed bacterial pna   - Pt w/ high secretion burden post intubation. Pulmonary toileting modalities; on hypertonic saline nebulization & duoneb   - CTA with no signs of PE, bilateral lower lobe infiltrates.  - Requiring FIO2 100% on ventilator, will continue to wean as tolerates to maintain sats>92%  - f/u serial abgs and chest x-ray    #CV:  Hypotension requiring pressor support  - likely sedation related   - Will continue to titrate down pressor as tolerated while maintaining MAP goal >65  - POCUS with evidence decreased LV dysfunction from previous echo w/ hx of mod/severe AS per chart review will get official TTE to evaluate  - Trops 103.6, peaked - likely demand. No evidence of ischemia on EKG     GI:  Transaminitis likely in the setting of cardiac arrest  - will continue to trend LFTs  - Will consider RUQ US LFTs remain elevated  - Diet: TF  - GI ppx: PPI  - OGT     Renal:  GAUTAM on ?CKD  - Likely prerenal   - whalen- unable to place by primary team. Urology consulted for insertion, cont to monitor strict I/Os, trend BUN/Cr  - replete lytes as appropriate     ID:  RSV PNA  - Empirically covered with ceftriaxone and azithromycin   - afebrile, w/ leukocytosis will continue to trend labs  - Lactate 4.8  - F/u MRSA/Blood cxs/legionella/UA/strep pneumoniae ag/sputum cx    Heme:  DVT ppx: HSQ  - cbc stable, continue to monitor     Endo:  sliding scale   - maintaining goal glucose<180 with ISS  - cont to monitor FS    Dispo:  - Guarded/ Full Code. Family at bedside updated on severity of status, will continue to update per course.

## 2023-12-08 NOTE — CHART NOTE - NSCHARTNOTEFT_GEN_A_CORE
87 year old female PMH HTN BIBEMS accompanied by granddaughter c/o SOB x 4 days a/w cough w/ white phlegm. Per granddaughter, other household members with URI symptoms. Per EMS, pt wit audible wheezing - denies hx asthma / COPD - SPO2 80s on RA, improved to 90s s/p breathing treatment x 2. Pt denies CP, LE swelling. Per granddaughter w/o hx lung or heart disease. No prior history of CHF, no prior cardiac stents. Pt admitted with RSV pna with possible superimposed bacterial pna and started on bipap for respiratory support.     This AM pt was found unresponsive on bipap. RRT called and pt was found to be pulseless. Code blue called. ACLS initated at 714AM. Initial rhythm was PEA. 4 epi and 1 bicarb given. Pt intubated successfully with heavy amount of secretions suctioned from the airway after intubation. Pt got ROSC at 726 AM with BP in 200s on 1mcg of Levophed and satting 100% with ETCO2 in 30s-40s.  Levophed dose decreased.  IO placed.  POCUS with LV>RV with preserved LV systolic function. IVC plethoric.   Pt then lost pulse again and ACLS initiated at 743AM with PEA rhythm. 1 epi given. ROSC achieved at 749AM with BP in 200s again on Levo. Sats 100% and ETCO2 30s-40s.  Labs drawn, EKG performed without STEMI.   Family updated. Pt transferred to ICU.     I spent 60 minutes assessing presenting problems of acute illness, which pose high probability of life threatening deterioration or end organ damage/dysfunction, as well as medical decision making including initiating plan of care, reviewing data, reviewing radiologic exams, discussing with multidisciplinary team,  discussing goals of care with patient/family, and writing this note.  Non-inclusive of procedures performed

## 2023-12-08 NOTE — CONSULT NOTE ADULT - ASSESSMENT
MrEulalia/Mrs. is a __ year old __ presenting with __ found to have__admitted to the ICU for further management.    # Neuro:  //  -A/Ox3  - sedation:  - pain control:   -mobilize oob to chair     #Resp:  //  -satting adequately on RA, maintain sats>92%   - incentive spirometry     #CV:  //  -HD stable without vasopressor requirements  - MAP goal>65    #GI:  //  -Diet:  - GI ppx:  -last BM  - Bowel regimen    #Renal:  //  - fluids/IVL:   - whalen, cont to monitor strict I/Os  - voiding freely, making adequate UO  - replete lytes as appropriate     #ID:  //  - afebrile, wbc nl  - cont to monitor off abx     #Heme:  //DVT ppx  - cbc stable  -SCDs/chemoppx:    #Endo:  //sliding scale   - maintaining goal glucose<180 with ISS  - cont to monitor FS 87 year old female PMH HTN BIBEMS accompanied by granddaughter c/o SOB x 4 days a/w cough w/ white phlegm. Pt admitted for RSV w/ superimposed CAP. RRT called this AM after being found unresponsive on BIPAP then code blue called as pt was pulseless. Pt cardiac arrest w/ an approx 16 min downtime with ROSC. Pt admitted to ICU for further management.     DX: RSV pneumonia w/     # Neuro:  // Unresponsive likely in the setting of cardiac arrest   - Baseline pt is A&O x4 as per chart review  - Pt w/ ? myoclonus jerking mvt will f/u spot eeg  - Will consider CTH if mentation does not improve  - Sedation: Propofol, versed IVP PRN  - Will continue to wean off sedation and evaluate mentation    #Resp:  // RSV PNA requiring BIPAP s/p cardiac arrest   - Acute hypercapnic/hypoxic respiratory failure likely in the setting of mucus plugging vs superimposed bacterial pna   - CTA with no signs of PE, bilateral lower lobe infiltrates.  - AB.24/47/180/20  -satting adequately on ventilator, maintain sats>92%  - f/u abg  - C/w duonbes     #CV:  // Hypotension requiring pressor support  - likely 2/2 vasoplegia    - Pt w/ hx of mod/severe AS per chart review will get official TTE to evaluate  - Trops 103.6, decreased from prior labs, will continue to trend  - Will continue to wean off pressor as tolerated while maintaining MAP goal >65    #GI:  transaminitis likely in the setting of cardiac arrest  - will continue to trend LFTs  -Diet: TF  - GI ppx: PPI  - OGT     #Renal:  // GAUTAM  - Likely prerenal   - whalen, cont to monitor strict I/Os, trend BUN/Cr  - replete lytes as appropriate     #ID:  //RSV PNA  - Empirically covered with ceftriaxone and azithromycin   - afebrile, w/ leukocytosis will continue to trend labs  - Lactate 4.8  - F/u MRSA/Blood cxs/legionella/UA/strep pneumoniae ag/sputum cx    #Heme:  //DVT ppx: HSQ  - cbc stable    #Endo:  //sliding scale   - maintaining goal glucose<180 with ISS  - cont to monitor FS 87 year old female PMH HTN BIBEMS accompanied by granddaughter c/o SOB x 4 days a/w cough w/ white phlegm. Pt admitted for RSV w/ superimposed CAP. RRT called this AM after being found unresponsive on BIPAP then code blue called as pt was pulseless. Pt cardiac arrest w/ an approx 16 min downtime with ROSC. Pt admitted to ICU for further management.     DX: RSV pneumonia s/p cardiac arrest     # Neuro:  // Unresponsive likely in the setting of cardiac arrest   - Baseline pt is A&O x4 as per chart review  - Pt w/ ? myoclonus jerking mvt will f/u spot eeg  - Will consider MRI if mentation does not improve  - Sedation: Propofol, versed IVP PRN  - Will continue to wean off sedation and evaluate mentation    #Resp:  // RSV PNA requiring BIPAP s/p cardiac arrest with intubation on ventilator   - Acute hypercapnic/hypoxic respiratory failure likely in the setting of mucus plugging vs superimposed bacterial pna   - Pt w/ high secretion burden post intubation. On hypertonic saline nebulization  - CTA with no signs of PE, bilateral lower lobe infiltrates.  - AB.24/47/180/20  - Requiring FIO2 100% on ventilator, will continue to maintain sats>92%  - f/u abg and chest x-ray  - C/w duonbes     #CV:  // Hypotension requiring pressor support  - likely sedation related   - Will continue to titrate down pressor as tolerated while maintaining MAP goal >65  - Pt w/ hx of mod/severe AS per chart review will get official TTE to evaluate  - Trops 103.6, decreased from prior labs, will continue to trend    #GI:  //Transaminitis likely in the setting of cardiac arrest  - will continue to trend LFTs  - Will consider RUQ US LFTs remain elevated  -Diet: TF  - GI ppx: PPI  - OGT     #Renal:  // GAUTAM  - Likely prerenal   - whalen, cont to monitor strict I/Os, trend BUN/Cr  - replete lytes as appropriate     #ID:  //RSV PNA  - Empirically covered with ceftriaxone and azithromycin   - afebrile, w/ leukocytosis will continue to trend labs  - Lactate 4.8  - F/u MRSA/Blood cxs/legionella/UA/strep pneumoniae ag/sputum cx    #Heme:  //DVT ppx: HSQ  - cbc stable    #Endo:  //sliding scale   - maintaining goal glucose<180 with ISS  - cont to monitor FS 87 year old female PMH HTN, HLD, and cardiomyopathy BIBEMS accompanied by granddaughter c/o SOB x 4 days a/w cough w/ white phlegm. Pt admitted for RSV w/ superimposed CAP. RRT called this AM after being found unresponsive on BIPAP then code blue called as pt was pulseless. Pt cardiac arrest w/ an approx 16 min downtime with ROSC. Pt admitted to ICU for further management.     DX: RSV pneumonia s/p cardiac arrest     # Neuro:  // Unresponsive likely in the setting of post cardiac arrest   - Baseline pt is A&O x4 as per chart review  - + Brainstem reflexes, not following commands or purposeful movements, w/ ? myoclonus jerking mvt   - STAT EEG f/u results initiate antiepileptics as warranted   - Will consider CT/MRI if mentation does not improve to assess for anoxia   - Sedation: Propofol wean as tolerates, versed IVP PRN for ventilator synchrony and myoclonus   - Will continue to wean off sedation and evaluate mentation    #Resp:  // RSV PNA requiring BIPAP s/p cardiac arrest with intubation now on ventilator   - Acute hypercapnic/hypoxic respiratory failure likely in the setting of mucus plugging vs superimposed bacterial pna   - Pt w/ high secretion burden post intubation. Pulmonary toileting modalities; on hypertonic saline nebulization & duoneb   - CTA with no signs of PE, bilateral lower lobe infiltrates.  - AB.24/47/180/20  - Requiring FIO2 100% on ventilator, will continue to wean as tolerates to maintain sats>92%  - f/u serial absg and chest x-ray    #CV:  // Hypotension requiring pressor support  - likely sedation related   - Will continue to titrate down pressor as tolerated while maintaining MAP goal >65  - POCUS with evidence decreased LV dysfunction from previous echo w/ hx of mod/severe AS per chart review will get official TTE to evaluate  - Trops 103.6, peaked - likely demand. No evidence of ischemia on EKG     #GI:  //Transaminitis likely in the setting of cardiac arrest  - will continue to trend LFTs  - Will consider RUQ US LFTs remain elevated  - Diet: TF  - GI ppx: PPI  - OGT     #Renal:  // GAUTAM on ?CKD  - Likely prerenal   - whalen- unable to place by team Urology consulted for insertion, cont to monitor strict I/Os, trend BUN/Cr  - replete lytes as appropriate     #ID:  //RSV PNA  - Empirically covered with ceftriaxone and azithromycin   - afebrile, w/ leukocytosis will continue to trend labs  - Lactate 4.8  - F/u MRSA/Blood cxs/legionella/UA/strep pneumoniae ag/sputum cx    #Heme:  //DVT ppx: HSQ  - cbc stable, continue to monitor     #Endo:  //sliding scale   - maintaining goal glucose<180 with ISS  - cont to monitor FS    ##Dispo:  - Guarded/ Full Code. Family at bedside updated on severity of status, will continue to update per course. Discussed with ICU attending MD Harrison.

## 2023-12-08 NOTE — PROCEDURE NOTE - NSPROCNAME_GEN_A_CORE
Peripheral Line Insertion
Arterial Puncture/Cannulation
Urinary Device Placement
Intraosseous Infusion

## 2023-12-08 NOTE — PROCEDURE NOTE - NSPROCDETAILS_GEN_ALL_CORE
sterile technique, indwelling urinary device inserted 2 sterile technique, indwelling urinary device inserted/a urinary catheter insertion kit was used for all insertion materials

## 2023-12-08 NOTE — CONSULT NOTE ADULT - SUBJECTIVE AND OBJECTIVE BOX
Patient is a 87y old  Female who presents with a chief complaint of SOB from CAP (08 Dec 2023 11:54)    HPI:  87 year old female with HTN, Cardiomyopathy ( per record ).   Brought to ED  accompanied by granddaughter with  SOB x 4 days along with cough and  white phlegm.   Per granddaughter, other household members with URI symptoms.   EMS found  pt with audible wheezing, SPO2 in  80s on RA which improved to 90s s/p breathing treatment x 2.   Pt denied CP, LE swelling, no history of Asthma, COPD or CHF.  RVP +ve for RSV, CTA chest: no PE but multifocal pneumonia.  +ve leukocytosis with left shift.  Initially started on BiPAP support.  12/08/23:  Found unconscious and pulseless while on BiPAP. RRT to Code blue, was revived, intubated and transferred to CCU.  Large amount of secretions suctioned from airway during intubation.  At time of my evaluation, on vent, sedated with propofol, requiring Levophed support. SPO2 100% on 100% FiO2 and +5 peep.    PAST MEDICAL & SURGICAL HISTORY:  Hypertension    H/O cardiomyopathy    No significant past surgical history    FAMILY HISTORY:  No pertinent family history in first degree relatives    SOCIAL HISTORY: BMI (kg/m2): 41 (12-08 @ 08:05). not available    Allergies  oxycodone (Anaphylaxis)    MEDICATIONS  (STANDING):  albuterol/ipratropium for Nebulization 3 milliLiter(s) Nebulizer every 6 hours  aspirin  chewable 81 milliGRAM(s) Chew daily  azithromycin  IVPB 500 milliGRAM(s) IV Intermittent every 24 hours  cefTRIAXone   IVPB 1000 milliGRAM(s) IV Intermittent every 24 hours  chlorhexidine 0.12% Liquid 15 milliLiter(s) Oral Mucosa every 12 hours  chlorhexidine 2% Cloths 1 Application(s) Topical <User Schedule>  heparin   Injectable 5000 Unit(s) SubCutaneous every 8 hours  influenza  Vaccine (HIGH DOSE) 0.7 milliLiter(s) IntraMuscular once  insulin lispro (ADMELOG) corrective regimen sliding scale   SubCutaneous every 6 hours  norepinephrine Infusion 0.05 MICROgram(s)/kG/Min (9.41 mL/Hr) IV Continuous <Continuous>  pantoprazole  Injectable 40 milliGRAM(s) IV Push daily  propofol Infusion 10 MICROgram(s)/kG/Min (6.02 mL/Hr) IV Continuous <Continuous>  sodium chloride 3%  Inhalation 4 milliLiter(s) Inhalation every 6 hours    MEDICATIONS  (PRN):  midazolam Injectable 2 milliGRAM(s) IV Push every 4 hours PRN agitation    REVIEW OF SYSTEMS:  Not able to provide.    Vital Signs Last 24 Hrs  T(C): 37.1 (08 Dec 2023 12:15), Max: 39.7 (07 Dec 2023 12:55)  T(F): 98.8 (08 Dec 2023 12:15), Max: 103.5 (07 Dec 2023 12:55)  HR: 63 (08 Dec 2023 12:15) (63 - 110)  BP: 114/52 (08 Dec 2023 12:15) (101/89 - 189/89)  BP(mean): 72 (08 Dec 2023 12:15) (67 - 95)  RR: 27 (08 Dec 2023 12:15) (17 - 30)  SpO2: 100% (08 Dec 2023 12:15) (92% - 100%)    Parameters below as of 08 Dec 2023 06:31  Patient On (Oxygen Delivery Method): BiPAP/CPAP    PHYSICAL EXAM:  GEN:         On vent, sedated  HEENT:      ETT  RESP:         no wheezing  CVS:           Regular rate and rhythm.   ABD:         Soft, non-tender, non-distended;   SKIN:         Warm and dry.  EXTR:         No clubbing, cyanosis or edema  CNS:           sedated  PSYCH:       sedated.    LABS:                        10.9   11.41 )-----------( 145      ( 08 Dec 2023 11:50 )             34.4     12-08    131<L>  |  98  |  40<H>  ----------------------------<  398<H>  3.7   |  25  |  2.14<H>    Ca    7.2<L>      08 Dec 2023 07:28  Phos  6.3     12-08  Mg     2.5     12-08    TPro  5.5<L>  /  Alb  1.9<L>  /  TBili  0.3  /  DBili  x   /  AST  735<H>  /  ALT  472<H>  /  AlkPhos  61  12-08    12-08 @ 09:25  pH: --  pCO2: 47  pO2: 180  SaO2: 98.2  12-07 @ 20:53  pH: --  pCO2: 44  pO2: 70  SaO2: 92.9    Urinalysis Basic - ( 08 Dec 2023 07:28 )    Color: x / Appearance: x / SG: x / pH: x  Gluc: 398 mg/dL / Ketone: x  / Bili: x / Urobili: x   Blood: x / Protein: x / Nitrite: x   Leuk Esterase: x / RBC: x / WBC x   Sq Epi: x / Non Sq Epi: x / Bacteria: x    EKG: Sinus    RADIOLOGY & ADDITIONAL STUDIES:  < from: CT Angio Chest PE Protocol w/ IV Cont (12.07.23 @ 15:03) >  ACC: [id number] EXAM:  CT ANGIO CHEST PULM ART [facility]   ORDERED BY: PETER LOZA     PROCEDURE DATE:  12/07/2023      INTERPRETATION:  HISTORY: Shortness of breath. Hypoxia.    EXAMINATION: CTA CHEST was performed for evaluation of the pulmonary   arteries. Multiplanar reformatted images and MIPS were acquired.  CONTRAST/COMPLICATIONS:  IV Contrast: Omnipaque 350  90 cc administered   10 cc discarded  Oral Contrast: NONE  Complications: None reported at time of study completion    COMPARISON: None available.    FINDINGS:    PULMONARY ARTERIES: many of the segmental and subsegmental pulmonary   branches are limited in assessment secondary to motion. No pulmonary   embolism detected.    MEDIASTINUM: Normal heart size. No pericardial effusion. Thoracic aorta   normal caliber.  No large mediastinal lymph nodes.    AIRWAYS, LUNGS, PLEURA: Patchy and ill-defined consolidative opacities   involving the bilateral upper and lower lobes. No pleural effusion.    IMAGED ABDOMEN: Unremarkable.    SOFT TISSUES and NECK: Enlarged left thyroid lobe with nodule.    BONES: Unremarkable.    IMPRESSION:.    No pulmonary embolism detected. Please note that many of the segmental   and subsegmental pulmonary arterial branches are not adequately assessed   due to motion.    Findings compatible with multifocal pneumonia. Recommend CT chest   follow-up in 1-3 months after treatment to ensure clearing.    HOLLY MOORE MD; Attending Radiologist  This document has beenelectronically signed. Dec  7 2023  3:21PM    ASSESSMENT AND PLAN:  ·	Acute hypoxic Respiratory failure  ·	S/P Cardiac arrest.  ·	Hypotension.  ·	Multifocal pneumonia.  ·	Leukocytosis with left shift.  ·	Positive RSV.  ·	Renal insuffiencey  ·	Anemia.  ·	HTN hx.    Continue full vent support, titrate down FiO2 as tolerated.   Follow ABG and post intubation chest XRAY  On hypertonic saline nebulization.  Titrate down Levophed as tolerated.  Continue antibiotic and bronchodilator.  Follow neuro status.  DVT and GI prophylaxis.  Patient is a 87y old  Female who presents with a chief complaint of SOB from CAP (08 Dec 2023 11:54)    HPI:  87 year old female with HTN, Cardiomyopathy ( per record ).   Brought to ED  accompanied by granddaughter with  SOB x 4 days along with cough and  white phlegm.   Per granddaughter, other household members with URI symptoms.   EMS found  pt with audible wheezing, SPO2 in  80s on RA which improved to 90s s/p breathing treatment x 2.   Pt denied CP, LE swelling, no history of Asthma, COPD or CHF.  RVP +ve for RSV, CTA chest: no PE but multifocal pneumonia.  +ve leukocytosis with left shift.  Initially started on BiPAP support.  12/08/23:  Found unconscious and pulseless while on BiPAP. RRT to Code blue, was revived, intubated and transferred to CCU.  Large amount of secretions suctioned from airway during intubation.  At time of my evaluation, on vent, sedated with propofol, requiring Levophed support. SPO2 100% on 100% FiO2 and +5 peep.    PAST MEDICAL & SURGICAL HISTORY:  Hypertension    H/O cardiomyopathy    No significant past surgical history    FAMILY HISTORY:  No pertinent family history in first degree relatives    SOCIAL HISTORY: BMI (kg/m2): 41 (12-08 @ 08:05). not available    Allergies  oxycodone (Anaphylaxis)    MEDICATIONS  (STANDING):  albuterol/ipratropium for Nebulization 3 milliLiter(s) Nebulizer every 6 hours  aspirin  chewable 81 milliGRAM(s) Chew daily  azithromycin  IVPB 500 milliGRAM(s) IV Intermittent every 24 hours  cefTRIAXone   IVPB 1000 milliGRAM(s) IV Intermittent every 24 hours  chlorhexidine 0.12% Liquid 15 milliLiter(s) Oral Mucosa every 12 hours  chlorhexidine 2% Cloths 1 Application(s) Topical <User Schedule>  heparin   Injectable 5000 Unit(s) SubCutaneous every 8 hours  influenza  Vaccine (HIGH DOSE) 0.7 milliLiter(s) IntraMuscular once  insulin lispro (ADMELOG) corrective regimen sliding scale   SubCutaneous every 6 hours  norepinephrine Infusion 0.05 MICROgram(s)/kG/Min (9.41 mL/Hr) IV Continuous <Continuous>  pantoprazole  Injectable 40 milliGRAM(s) IV Push daily  propofol Infusion 10 MICROgram(s)/kG/Min (6.02 mL/Hr) IV Continuous <Continuous>  sodium chloride 3%  Inhalation 4 milliLiter(s) Inhalation every 6 hours    MEDICATIONS  (PRN):  midazolam Injectable 2 milliGRAM(s) IV Push every 4 hours PRN agitation    REVIEW OF SYSTEMS:  Not able to provide.    Vital Signs Last 24 Hrs  T(C): 37.1 (08 Dec 2023 12:15), Max: 39.7 (07 Dec 2023 12:55)  T(F): 98.8 (08 Dec 2023 12:15), Max: 103.5 (07 Dec 2023 12:55)  HR: 63 (08 Dec 2023 12:15) (63 - 110)  BP: 114/52 (08 Dec 2023 12:15) (101/89 - 189/89)  BP(mean): 72 (08 Dec 2023 12:15) (67 - 95)  RR: 27 (08 Dec 2023 12:15) (17 - 30)  SpO2: 100% (08 Dec 2023 12:15) (92% - 100%)    Parameters below as of 08 Dec 2023 06:31  Patient On (Oxygen Delivery Method): BiPAP/CPAP    PHYSICAL EXAM:  GEN:         On vent, sedated  HEENT:      ETT  RESP:         no wheezing  CVS:           Regular rate and rhythm.   ABD:         Soft, non-tender, non-distended;   SKIN:         Warm and dry.  EXTR:         No clubbing, cyanosis or edema  CNS:           sedated  PSYCH:       sedated.    LABS:                        10.9   11.41 )-----------( 145      ( 08 Dec 2023 11:50 )             34.4     12-08    131<L>  |  98  |  40<H>  ----------------------------<  398<H>  3.7   |  25  |  2.14<H>    Ca    7.2<L>      08 Dec 2023 07:28  Phos  6.3     12-08  Mg     2.5     12-08    TPro  5.5<L>  /  Alb  1.9<L>  /  TBili  0.3  /  DBili  x   /  AST  735<H>  /  ALT  472<H>  /  AlkPhos  61  12-08    12-08 @ 09:25  pH: --  pCO2: 47  pO2: 180  SaO2: 98.2  12-07 @ 20:53  pH: --  pCO2: 44  pO2: 70  SaO2: 92.9    Urinalysis Basic - ( 08 Dec 2023 07:28 )    Color: x / Appearance: x / SG: x / pH: x  Gluc: 398 mg/dL / Ketone: x  / Bili: x / Urobili: x   Blood: x / Protein: x / Nitrite: x   Leuk Esterase: x / RBC: x / WBC x   Sq Epi: x / Non Sq Epi: x / Bacteria: x    EKG: Sinus    RADIOLOGY & ADDITIONAL STUDIES:  < from: CT Angio Chest PE Protocol w/ IV Cont (12.07.23 @ 15:03) >  ACC: [id number] EXAM:  CT ANGIO CHEST PULM ART [facility]   ORDERED BY: PETER LOZA     PROCEDURE DATE:  12/07/2023      INTERPRETATION:  HISTORY: Shortness of breath. Hypoxia.    EXAMINATION: CTA CHEST was performed for evaluation of the pulmonary   arteries. Multiplanar reformatted images and MIPS were acquired.  CONTRAST/COMPLICATIONS:  IV Contrast: Omnipaque 350  90 cc administered   10 cc discarded  Oral Contrast: NONE  Complications: None reported at time of study completion    COMPARISON: None available.    FINDINGS:    PULMONARY ARTERIES: many of the segmental and subsegmental pulmonary   branches are limited in assessment secondary to motion. No pulmonary   embolism detected.    MEDIASTINUM: Normal heart size. No pericardial effusion. Thoracic aorta   normal caliber.  No large mediastinal lymph nodes.    AIRWAYS, LUNGS, PLEURA: Patchy and ill-defined consolidative opacities   involving the bilateral upper and lower lobes. No pleural effusion.    IMAGED ABDOMEN: Unremarkable.    SOFT TISSUES and NECK: Enlarged left thyroid lobe with nodule.    BONES: Unremarkable.    IMPRESSION:.    No pulmonary embolism detected. Please note that many of the segmental   and subsegmental pulmonary arterial branches are not adequately assessed   due to motion.    Findings compatible with multifocal pneumonia. Recommend CT chest   follow-up in 1-3 months after treatment to ensure clearing.    HOLLY MOORE MD; Attending Radiologist  This document has beenelectronically signed. Dec  7 2023  3:21PM    ASSESSMENT AND PLAN:  ·	Acute hypoxic Respiratory failure  ·	S/P Cardiac arrest.  ·	Hypotension.  ·	Multifocal pneumonia.  ·	Leukocytosis with left shift.  ·	Positive RSV.  ·	Renal insuffiencey  ·	Anemia.  ·	HTN hx.    Continue full vent support, titrate down FiO2 as tolerated.   Follow ABG and post intubation chest XRAY  On hypertonic saline nebulization.  Titrate down Levophed as tolerated.  Continue antibiotic and bronchodilator.  Follow neuro status.  DVT and GI prophylaxis.

## 2023-12-08 NOTE — PROCEDURE NOTE - NSINDICATIONS_GEN_A_CORE
cardiac arrest
arterial puncture to obtain ABG's/blood sampling/critical patient/monitoring purposes
antibiotic therapy/fluid administration
strict intake/output during critical illness

## 2023-12-08 NOTE — EEG REPORT - NS EEG TEXT BOX
HEYDI RUBIO [id number]     Study Date: 	12-08-23  Duration:  x 20 mins    --------------------------------------------------------------------------------------------------  History:  CC/ HPI Patient is a 87y old  Female who presents with a chief complaint of SOB from CAP (08 Dec 2023 12:39)    MEDICATIONS  (STANDING):  albuterol/ipratropium for Nebulization 3 milliLiter(s) Nebulizer every 6 hours  aspirin  chewable 81 milliGRAM(s) Chew daily  azithromycin  IVPB 500 milliGRAM(s) IV Intermittent every 24 hours  cefTRIAXone   IVPB 1000 milliGRAM(s) IV Intermittent every 24 hours  chlorhexidine 0.12% Liquid 15 milliLiter(s) Oral Mucosa every 12 hours  chlorhexidine 2% Cloths 1 Application(s) Topical <User Schedule>  heparin   Injectable 5000 Unit(s) SubCutaneous every 8 hours  influenza  Vaccine (HIGH DOSE) 0.7 milliLiter(s) IntraMuscular once  insulin lispro (ADMELOG) corrective regimen sliding scale   SubCutaneous every 6 hours  norepinephrine Infusion 0.05 MICROgram(s)/kG/Min (9.41 mL/Hr) IV Continuous <Continuous>  pantoprazole  Injectable 40 milliGRAM(s) IV Push daily  propofol Infusion 10 MICROgram(s)/kG/Min (6.02 mL/Hr) IV Continuous <Continuous>  sodium chloride 3%  Inhalation 4 milliLiter(s) Inhalation every 6 hours    --------------------------------------------------------------------------------------------------  Study Interpretation:    [Abbreviation Key:  PDR=alpha rhythm/posterior dominant rhythm. A-P=anterior posterior.  Amplitude: ‘very low’:<20; ‘low’:20-49; ‘medium’:; ‘high’:>150uV.  Persistence for periodic/rhythmic patterns (% of epoch) ‘rare’:<1%; ‘occasional’:1-10%; ‘frequent’:10-50%; ‘abundant’:50-90%; ‘continuous’:>90%.  Persistence for sporadic discharges: ‘rare’:<1/hr; ‘occasional’:1/min-1/hr; ‘frequent’:>1/min; ‘abundant’:>1/10 sec.  RPP=rhythmic and periodic patterns; GRDA=generalized rhythmic delta activity; FIRDA=frontal intermittent GRDA; LRDA=lateralized rhythmic delta activity; TIRDA=temporal intermittent rhythmic delta activity;  LPD=PLED=lateralized periodic discharges; GPD=generalized periodic discharges; BIPDs =bilateral independent periodic discharges; Mf=multifocal; SIRPDs=stimulus induced rhythmic, periodic, or ictal appearing discharges; BIRDs=brief potentially ictal rhythmic discharges >4 Hz, lasting .5-10s; PFA (paroxysmal bursts >13 Hz or =8 Hz <10s).  Modifiers: +F=with fast component; +S=with spike component; +R=with rhythmic component.  S-B=burst suppression pattern.  Max=maximal. N1-drowsy; N2-stage II sleep; N3-slow wave sleep. SSS/BETS=small sharp spikes/benign epileptiform transients of sleep. HV=hyperventilation; PS=photic stimulation]    FINDINGS:      Background:  Continuity: discontinuous with diffuse supression  Symmetry: symmetric  PDR: absent  Reactivity: absent  Voltage: low (<10 uV)  Anterior Posterior Gradient: absent  Other background findings: none  Breach: absent    Background Slowing:  Generalized slowing: as above.  Focal slowing: none was present.    State Changes:   None    Sporadic Epileptiform Discharges:    None    Rhythmic and Periodic Patterns (RPPs):  None     Electrographic and Electroclinical seizures:  18 brief events with brief ~1 secs myoclonic jerking of whole body. EEG is obscured by diffuse muscle and movement artifact and does not provide clear diagnostic yield.    Other Clinical Events:  None    Activation Procedures:   -Hyperventilation was not performed.    -Photic stimulation was not performed.    Artifacts:  Intermittent myogenic and movement artifacts were noted during events as above.    ECG:  The heart rate on single channel ECG was predominantly between 60-70 BPM.    EEG Classification / Summary:  Abnormal EEG study  Multiple myoclonic jerking of the whole body. EEG limited due to coinciding diffuse muscle and movement artifact.  Diffuse suppression of the background     -----------------------------------------------------------------------------------------------------  Clinical Impression:  Multiple myoclonic jerking of the whole body. EEG limited due to coinciding diffuse muscle and movement artifact. Clinical correlation recommended.  Severe diffuse cerebral dysfunction.  This is a preliminary report pending attending review and attestation.    Rebeca Villalta MD  Fellow, [facility]      -------------------------------------------------------------------------------------------------------  [facility] EEG Reading Room Ph#: [phone]  Epilepsy Answering Service after 5PM and before 8:30AM: Ph#: [phone]   HEYDI RUBIO [id number]     Study Date: 	12-08-23  Duration:  x 20 mins    --------------------------------------------------------------------------------------------------  History:  CC/ HPI Patient is a 87y old  Female who presents with a chief complaint of SOB from CAP (08 Dec 2023 12:39)    MEDICATIONS  (STANDING):  albuterol/ipratropium for Nebulization 3 milliLiter(s) Nebulizer every 6 hours  aspirin  chewable 81 milliGRAM(s) Chew daily  azithromycin  IVPB 500 milliGRAM(s) IV Intermittent every 24 hours  cefTRIAXone   IVPB 1000 milliGRAM(s) IV Intermittent every 24 hours  chlorhexidine 0.12% Liquid 15 milliLiter(s) Oral Mucosa every 12 hours  chlorhexidine 2% Cloths 1 Application(s) Topical <User Schedule>  heparin   Injectable 5000 Unit(s) SubCutaneous every 8 hours  influenza  Vaccine (HIGH DOSE) 0.7 milliLiter(s) IntraMuscular once  insulin lispro (ADMELOG) corrective regimen sliding scale   SubCutaneous every 6 hours  norepinephrine Infusion 0.05 MICROgram(s)/kG/Min (9.41 mL/Hr) IV Continuous <Continuous>  pantoprazole  Injectable 40 milliGRAM(s) IV Push daily  propofol Infusion 10 MICROgram(s)/kG/Min (6.02 mL/Hr) IV Continuous <Continuous>  sodium chloride 3%  Inhalation 4 milliLiter(s) Inhalation every 6 hours    --------------------------------------------------------------------------------------------------  Study Interpretation:    [Abbreviation Key:  PDR=alpha rhythm/posterior dominant rhythm. A-P=anterior posterior.  Amplitude: ‘very low’:<20; ‘low’:20-49; ‘medium’:; ‘high’:>150uV.  Persistence for periodic/rhythmic patterns (% of epoch) ‘rare’:<1%; ‘occasional’:1-10%; ‘frequent’:10-50%; ‘abundant’:50-90%; ‘continuous’:>90%.  Persistence for sporadic discharges: ‘rare’:<1/hr; ‘occasional’:1/min-1/hr; ‘frequent’:>1/min; ‘abundant’:>1/10 sec.  RPP=rhythmic and periodic patterns; GRDA=generalized rhythmic delta activity; FIRDA=frontal intermittent GRDA; LRDA=lateralized rhythmic delta activity; TIRDA=temporal intermittent rhythmic delta activity;  LPD=PLED=lateralized periodic discharges; GPD=generalized periodic discharges; BIPDs =bilateral independent periodic discharges; Mf=multifocal; SIRPDs=stimulus induced rhythmic, periodic, or ictal appearing discharges; BIRDs=brief potentially ictal rhythmic discharges >4 Hz, lasting .5-10s; PFA (paroxysmal bursts >13 Hz or =8 Hz <10s).  Modifiers: +F=with fast component; +S=with spike component; +R=with rhythmic component.  S-B=burst suppression pattern.  Max=maximal. N1-drowsy; N2-stage II sleep; N3-slow wave sleep. SSS/BETS=small sharp spikes/benign epileptiform transients of sleep. HV=hyperventilation; PS=photic stimulation]    FINDINGS:      Background:  Continuity: discontinuous with diffuse supression  Symmetry: symmetric  PDR: absent  Reactivity: absent  Voltage: low (<10 uV)  Anterior Posterior Gradient: absent  Other background findings: none  Breach: absent    Background Slowing:  Generalized slowing: as above.  Focal slowing: none was present.    State Changes:   None    Sporadic Epileptiform Discharges:    None    Rhythmic and Periodic Patterns (RPPs):  None     Electrographic and Electroclinical seizures:  18 brief events with brief ~1 secs myoclonic jerking of whole body. EEG is obscured by diffuse muscle and movement artifact and does not provide clear diagnostic yield.    Other Clinical Events:  None    Activation Procedures:   -Hyperventilation was not performed.    -Photic stimulation was not performed.    Artifacts:  Intermittent myogenic and movement artifacts were noted during events as above.    ECG:  The heart rate on single channel ECG was predominantly between 60-70 BPM.    EEG Classification / Summary:  Abnormal EEG study  Multiple myoclonic jerking of the whole body. EEG limited due to coinciding diffuse muscle and movement artifact.  Diffuse suppression of the background     -----------------------------------------------------------------------------------------------------  Clinical Impression:  Multiple myoclonic jerking of the whole body. EEG limited due to coinciding diffuse muscle and movement artifact. Clinical correlation recommended.  Severe diffuse cerebral dysfunction.  This is a preliminary report pending attending review and attestation.    Rebeca Villalta MD  Fellow, [facility]      -------------------------------------------------------------------------------------------------------  [facility] EEG Reading Room Ph#: [phone]  Epilepsy Answering Service after 5PM and before 8:30AM: Ph#: [phone]   HEYDI RUBIO [id number]     Study Date: 	12-08-23  Duration:  x 20 mins    --------------------------------------------------------------------------------------------------  History:  CC/ HPI Patient is a 87y old  Female who presents with a chief complaint of SOB from CAP (08 Dec 2023 12:39)    MEDICATIONS  (STANDING):  albuterol/ipratropium for Nebulization 3 milliLiter(s) Nebulizer every 6 hours  aspirin  chewable 81 milliGRAM(s) Chew daily  azithromycin  IVPB 500 milliGRAM(s) IV Intermittent every 24 hours  cefTRIAXone   IVPB 1000 milliGRAM(s) IV Intermittent every 24 hours  chlorhexidine 0.12% Liquid 15 milliLiter(s) Oral Mucosa every 12 hours  chlorhexidine 2% Cloths 1 Application(s) Topical <User Schedule>  heparin   Injectable 5000 Unit(s) SubCutaneous every 8 hours  influenza  Vaccine (HIGH DOSE) 0.7 milliLiter(s) IntraMuscular once  insulin lispro (ADMELOG) corrective regimen sliding scale   SubCutaneous every 6 hours  norepinephrine Infusion 0.05 MICROgram(s)/kG/Min (9.41 mL/Hr) IV Continuous <Continuous>  pantoprazole  Injectable 40 milliGRAM(s) IV Push daily  propofol Infusion 10 MICROgram(s)/kG/Min (6.02 mL/Hr) IV Continuous <Continuous>  sodium chloride 3%  Inhalation 4 milliLiter(s) Inhalation every 6 hours    --------------------------------------------------------------------------------------------------  Study Interpretation:    [Abbreviation Key:  PDR=alpha rhythm/posterior dominant rhythm. A-P=anterior posterior.  Amplitude: ‘very low’:<20; ‘low’:20-49; ‘medium’:; ‘high’:>150uV.  Persistence for periodic/rhythmic patterns (% of epoch) ‘rare’:<1%; ‘occasional’:1-10%; ‘frequent’:10-50%; ‘abundant’:50-90%; ‘continuous’:>90%.  Persistence for sporadic discharges: ‘rare’:<1/hr; ‘occasional’:1/min-1/hr; ‘frequent’:>1/min; ‘abundant’:>1/10 sec.  RPP=rhythmic and periodic patterns; GRDA=generalized rhythmic delta activity; FIRDA=frontal intermittent GRDA; LRDA=lateralized rhythmic delta activity; TIRDA=temporal intermittent rhythmic delta activity;  LPD=PLED=lateralized periodic discharges; GPD=generalized periodic discharges; BIPDs =bilateral independent periodic discharges; Mf=multifocal; SIRPDs=stimulus induced rhythmic, periodic, or ictal appearing discharges; BIRDs=brief potentially ictal rhythmic discharges >4 Hz, lasting .5-10s; PFA (paroxysmal bursts >13 Hz or =8 Hz <10s).  Modifiers: +F=with fast component; +S=with spike component; +R=with rhythmic component.  S-B=burst suppression pattern.  Max=maximal. N1-drowsy; N2-stage II sleep; N3-slow wave sleep. SSS/BETS=small sharp spikes/benign epileptiform transients of sleep. HV=hyperventilation; PS=photic stimulation]    FINDINGS:      Background:  Continuity: discontinuous with diffuse supression  Symmetry: symmetric  PDR: absent  Reactivity: absent  Voltage: low (<10 uV)  Anterior Posterior Gradient: absent  Other background findings: none  Breach: absent    Background Slowing:  Generalized slowing: as above.  Focal slowing: none was present.    State Changes:   None    Sporadic Epileptiform Discharges:    None    Rhythmic and Periodic Patterns (RPPs):  None     Electrographic and Electroclinical seizures:  18 brief events with brief ~1 secs myoclonic jerking of whole body. EEG is obscured by diffuse muscle and movement artifact but intermixed spiking apparent near 3-4hz.    Other Clinical Events:  None    Activation Procedures:   -Hyperventilation was not performed.    -Photic stimulation was not performed.    Artifacts:  Intermittent myogenic and movement artifacts were noted during events as above.    ECG:  The heart rate on single channel ECG was predominantly between 60-70 BPM.    EEG Classification / Summary:  Abnormal EEG study  Multiple myoclonic jerking of the whole body. EEG limited due to coinciding diffuse muscle and movement artifact.  Diffuse suppression of the background     -----------------------------------------------------------------------------------------------------  Clinical Impression:  Myoclonic seizures.  Severe diffuse cerebral dysfunction.  Findings are associated with poor prognosis after anoxia.    Rebeca Villalta MD  Fellow, [facility]      -------------------------------------------------------------------------------------------------------  [facility] EEG Reading Room Ph#: [phone]  Epilepsy Answering Service after 5PM and before 8:30AM: Ph#: [phone]   HEYDI RUBIO [id number]     Study Date: 	12-08-23  Duration:  x 20 mins    --------------------------------------------------------------------------------------------------  History:  CC/ HPI Patient is a 87y old  Female who presents with a chief complaint of SOB from CAP (08 Dec 2023 12:39)    MEDICATIONS  (STANDING):  albuterol/ipratropium for Nebulization 3 milliLiter(s) Nebulizer every 6 hours  aspirin  chewable 81 milliGRAM(s) Chew daily  azithromycin  IVPB 500 milliGRAM(s) IV Intermittent every 24 hours  cefTRIAXone   IVPB 1000 milliGRAM(s) IV Intermittent every 24 hours  chlorhexidine 0.12% Liquid 15 milliLiter(s) Oral Mucosa every 12 hours  chlorhexidine 2% Cloths 1 Application(s) Topical <User Schedule>  heparin   Injectable 5000 Unit(s) SubCutaneous every 8 hours  influenza  Vaccine (HIGH DOSE) 0.7 milliLiter(s) IntraMuscular once  insulin lispro (ADMELOG) corrective regimen sliding scale   SubCutaneous every 6 hours  norepinephrine Infusion 0.05 MICROgram(s)/kG/Min (9.41 mL/Hr) IV Continuous <Continuous>  pantoprazole  Injectable 40 milliGRAM(s) IV Push daily  propofol Infusion 10 MICROgram(s)/kG/Min (6.02 mL/Hr) IV Continuous <Continuous>  sodium chloride 3%  Inhalation 4 milliLiter(s) Inhalation every 6 hours    --------------------------------------------------------------------------------------------------  Study Interpretation:    [Abbreviation Key:  PDR=alpha rhythm/posterior dominant rhythm. A-P=anterior posterior.  Amplitude: ‘very low’:<20; ‘low’:20-49; ‘medium’:; ‘high’:>150uV.  Persistence for periodic/rhythmic patterns (% of epoch) ‘rare’:<1%; ‘occasional’:1-10%; ‘frequent’:10-50%; ‘abundant’:50-90%; ‘continuous’:>90%.  Persistence for sporadic discharges: ‘rare’:<1/hr; ‘occasional’:1/min-1/hr; ‘frequent’:>1/min; ‘abundant’:>1/10 sec.  RPP=rhythmic and periodic patterns; GRDA=generalized rhythmic delta activity; FIRDA=frontal intermittent GRDA; LRDA=lateralized rhythmic delta activity; TIRDA=temporal intermittent rhythmic delta activity;  LPD=PLED=lateralized periodic discharges; GPD=generalized periodic discharges; BIPDs =bilateral independent periodic discharges; Mf=multifocal; SIRPDs=stimulus induced rhythmic, periodic, or ictal appearing discharges; BIRDs=brief potentially ictal rhythmic discharges >4 Hz, lasting .5-10s; PFA (paroxysmal bursts >13 Hz or =8 Hz <10s).  Modifiers: +F=with fast component; +S=with spike component; +R=with rhythmic component.  S-B=burst suppression pattern.  Max=maximal. N1-drowsy; N2-stage II sleep; N3-slow wave sleep. SSS/BETS=small sharp spikes/benign epileptiform transients of sleep. HV=hyperventilation; PS=photic stimulation]    FINDINGS:      Background:  Continuity: discontinuous with diffuse supression  Symmetry: symmetric  PDR: absent  Reactivity: absent  Voltage: low (<10 uV)  Anterior Posterior Gradient: absent  Other background findings: none  Breach: absent    Background Slowing:  Generalized slowing: as above.  Focal slowing: none was present.    State Changes:   None    Sporadic Epileptiform Discharges:    None    Rhythmic and Periodic Patterns (RPPs):  None     Electrographic and Electroclinical seizures:  18 brief events with brief ~1 secs myoclonic jerking of whole body. EEG is obscured by diffuse muscle and movement artifact but intermixed spiking apparent near 3-4hz.    Other Clinical Events:  None    Activation Procedures:   -Hyperventilation was not performed.    -Photic stimulation was not performed.    Artifacts:  Intermittent myogenic and movement artifacts were noted during events as above.    ECG:  The heart rate on single channel ECG was predominantly between 60-70 BPM.    EEG Classification / Summary:  Abnormal EEG study  Multiple myoclonic jerking of the whole body. EEG limited due to coinciding diffuse muscle and movement artifact.  Diffuse suppression of the background     -----------------------------------------------------------------------------------------------------  Clinical Impression:  Myoclonic seizures.  Severe diffuse cerebral dysfunction.  Findings are associated with poor prognosis after anoxia.    Rebeca Villalta MD  Fellow, [facility]      -------------------------------------------------------------------------------------------------------  [facility] EEG Reading Room Ph#: [phone]  Epilepsy Answering Service after 5PM and before 8:30AM: Ph#: [phone]

## 2023-12-08 NOTE — PROGRESS NOTE ADULT - SUBJECTIVE AND OBJECTIVE BOX
Patient is a 87y old  Female who presents with a chief complaint of SOB from CAP (08 Dec 2023 12:39)      BRIEF HOSPITAL COURSE:   88 yo f pmhx HTN, moderate to severe AS admitted with hypoxic respiratory failure in setting of RSV requiring NIPPV with course c/b cardiac arrest (ROSC achieved ~16mins).    Events last 24 hours:   chart reviewed, EEG: +myoclonic seizure and diffuse slowing.  Keppra load ordered.  patient anuric, ivf bolus ordered earlier without improvement in urine output, low dose dobutamine.  remains on low dose levophed for bp support, weaning as tolerated      PAST MEDICAL & SURGICAL HISTORY:  Hypertension  H/O cardiomyopathy  No significant past surgical history      Allergies  oxycodone (Anaphylaxis)      FAMILY HISTORY:  No pertinent family history in first degree relatives      Social History:   from home       Review of Systems:  no active issues      Physical Examination:    General: intermittent myoclonic jerking, nad    HEENT: pupils 3mm nonreactive, ett and ogt in place    PULM: Coarse b/l    CVS: sinus/sinus aparna    ABD: Soft, nondistended, nontender, +bs    EXT: No edema, nontender    SKIN: Warm     NEURO: sedated on propofol, +myocloncic jerking, does not withdraw to noxious stimuli      Medications:  azithromycin  IVPB 500 milliGRAM(s) IV Intermittent every 24 hours  cefTRIAXone   IVPB 1000 milliGRAM(s) IV Intermittent every 24 hours  DOBUTamine Infusion 2.5 MICROgram(s)/kG/Min IV Continuous <Continuous>  norepinephrine Infusion 0.05 MICROgram(s)/kG/Min IV Continuous <Continuous>  albuterol/ipratropium for Nebulization 3 milliLiter(s) Nebulizer every 6 hours  sodium chloride 3%  Inhalation 4 milliLiter(s) Inhalation every 6 hours  midazolam Injectable 4 milliGRAM(s) IV Push every 2 hours PRN  propofol Infusion 10 MICROgram(s)/kG/Min IV Continuous <Continuous>  aspirin  chewable 81 milliGRAM(s) Chew daily  heparin   Injectable 5000 Unit(s) SubCutaneous every 8 hours  pantoprazole  Injectable 40 milliGRAM(s) IV Push daily  insulin lispro (ADMELOG) corrective regimen sliding scale   SubCutaneous every 6 hours  lactated ringers. 500 milliLiter(s) IV Continuous <Continuous>  influenza  Vaccine (HIGH DOSE) 0.7 milliLiter(s) IntraMuscular once  chlorhexidine 0.12% Liquid 15 milliLiter(s) Oral Mucosa every 12 hours  chlorhexidine 2% Cloths 1 Application(s) Topical <User Schedule>      Mode: AC/ CMV (Assist Control/ Continuous Mandatory Ventilation)  RR (machine): 24  TV (machine): 480  FiO2: 50  PEEP: 5  ITime: 1  MAP: 13  PIP: 34      ICU Vital Signs Last 24 Hrs  T(C): 36.3 (08 Dec 2023 22:00), Max: 37.8 (08 Dec 2023 09:10)  T(F): 97.3 (08 Dec 2023 22:00), Max: 100 (08 Dec 2023 09:10)  HR: 58 (08 Dec 2023 22:00) (54 - 89)  BP: 111/50 (08 Dec 2023 16:00) (101/89 - 173/70)  BP(mean): 69 (08 Dec 2023 16:00) (66 - 95)  ABP: 125/46 (08 Dec 2023 22:00) (108/38 - 134/56)  ABP(mean): 73 (08 Dec 2023 22:00) (46 - 83)  RR: 24 (08 Dec 2023 22:00) (13 - 30)  SpO2: 98% (08 Dec 2023 22:00) (92% - 100%)    O2 Parameters below as of 08 Dec 2023 19:18  Patient On (Oxygen Delivery Method): ventilator      Vital Signs Last 24 Hrs  T(C): 36.3 (08 Dec 2023 22:00), Max: 37.8 (08 Dec 2023 09:10)  T(F): 97.3 (08 Dec 2023 22:00), Max: 100 (08 Dec 2023 09:10)  HR: 58 (08 Dec 2023 22:00) (54 - 89)  BP: 111/50 (08 Dec 2023 16:00) (101/89 - 173/70)  BP(mean): 69 (08 Dec 2023 16:00) (66 - 95)  RR: 24 (08 Dec 2023 22:00) (13 - 30)  SpO2: 98% (08 Dec 2023 22:00) (92% - 100%)    Parameters below as of 08 Dec 2023 19:18  Patient On (Oxygen Delivery Method): ventilator      ABG - ( 08 Dec 2023 21:25 )  pH, Arterial: 7.33  pH, Blood: x     /  pCO2: 38    /  pO2: 98    / HCO3: 20    / Base Excess: -5.5  /  SaO2: 97.9        I&O's Detail    07 Dec 2023 07:01  -  08 Dec 2023 07:00  --------------------------------------------------------  IN:    Oral Fluid: 50 mL    sodium chloride 0.9%: 450 mL  Total IN: 500 mL    OUT:  Total OUT: 0 mL  Total NET: 500 mL      08 Dec 2023 07:01  -  08 Dec 2023 22:25  --------------------------------------------------------  IN:    IV PiggyBack: 350 mL    Lactated Ringers: 500 mL    Nepro: 20 mL    Norepinephrine: 300.6 mL    Propofol: 307.1 mL  Total IN: 1477.7 mL    OUT:    Indwelling Catheter - Urethral (mL): 25 mL    Voided (mL): 0 mL  Total OUT: 25 mL  Total NET: 1452.7 mL      LABS:                        9.7    11.68 )-----------( 144      ( 08 Dec 2023 21:36 )             29.4     12-08    132<L>  |  101  |  54<H>  ----------------------------<  164<H>  4.1   |  20<L>  |  2.97<H>    Ca    7.7<L>      08 Dec 2023 21:36  Phos  5.8     12-08  Mg     2.6     12-08    TPro  6.2  /  Alb  2.1<L>  /  TBili  0.4  /  DBili  x   /  AST  1268<H>  /  ALT  868<H>  /  AlkPhos  81  12-08      CARDIAC MARKERS ( 07 Dec 2023 17:30 )  x     / x     / 962 U/L / x     / 14.3 ng/mL      CAPILLARY BLOOD GLUCOSE  POCT Blood Glucose.: 157 mg/dL (08 Dec 2023 17:07)      PT/INR - ( 08 Dec 2023 11:50 )   PT: 12.5 sec;   INR: 1.05 ratio     PTT - ( 08 Dec 2023 11:50 )  PTT:29.9 sec    Urinalysis Basic - ( 08 Dec 2023 21:36 )  Color: x / Appearance: x / SG: x / pH: x  Gluc: 164 mg/dL / Ketone: x  / Bili: x / Urobili: x   Blood: x / Protein: x / Nitrite: x   Leuk Esterase: x / RBC: x / WBC x   Sq Epi: x / Non Sq Epi: x / Bacteria: x      CULTURES:  pending      RADIOLOGY:  < from: CT Angio Chest PE Protocol w/ IV Cont (12.07.23 @ 15:03) >    ACC: [id number] EXAM:  CT ANGIO CHEST PULM ART [facility]   ORDERED BY: PETER LOZA     PROCEDURE DATE:  12/07/2023      INTERPRETATION:  HISTORY: Shortness of breath. Hypoxia.    EXAMINATION: CTA CHEST was performed for evaluation of the pulmonary   arteries. Multiplanar reformatted images and MIPS were acquired.  CONTRAST/COMPLICATIONS:  IV Contrast: Omnipaque 350  90 cc administered   10 cc discarded  Oral Contrast: NONE  Complications: None reported at time of study completion    COMPARISON: None available.    FINDINGS:    PULMONARY ARTERIES: many of the segmental and subsegmental pulmonary   branches are limited in assessment secondary to motion. No pulmonary   embolism detected.    MEDIASTINUM: Normal heart size. No pericardial effusion. Thoracic aorta   normal caliber.  No large mediastinal lymph nodes.    AIRWAYS, LUNGS, PLEURA: Patchy and ill-defined consolidative opacities   involving the bilateral upper and lower lobes. No pleural effusion.    IMAGED ABDOMEN: Unremarkable.    SOFT TISSUES and NECK: Enlarged left thyroid lobe with nodule.    BONES: Unremarkable.      IMPRESSION:.    No pulmonary embolism detected. Please note that many of the segmental   and subsegmental pulmonary arterial branches are not adequately assessed   due to motion.    Findings compatible with multifocal pneumonia. Recommend CT chest   follow-up in 1-3 months after treatment to ensure clearing.    --- End of Report ---    HOLLY MOORE MD; Attending Radiologist  This document has beenelectronically signed. Dec  7 2023  3:21PM    < end of copied text >   Patient is a 87y old  Female who presents with a chief complaint of SOB from CAP (08 Dec 2023 12:39)      BRIEF HOSPITAL COURSE:   86 yo f pmhx HTN, moderate to severe AS admitted with hypoxic respiratory failure in setting of RSV requiring NIPPV with course c/b cardiac arrest (ROSC achieved ~16mins).    Events last 24 hours:   chart reviewed, EEG: +myoclonic seizure and diffuse slowing.  Keppra load ordered.  patient anuric, ivf bolus ordered earlier without improvement in urine output, low dose dobutamine.  remains on low dose levophed for bp support, weaning as tolerated      PAST MEDICAL & SURGICAL HISTORY:  Hypertension  H/O cardiomyopathy  No significant past surgical history      Allergies  oxycodone (Anaphylaxis)      FAMILY HISTORY:  No pertinent family history in first degree relatives      Social History:   from home       Review of Systems:  no active issues      Physical Examination:    General: intermittent myoclonic jerking, nad    HEENT: pupils 3mm nonreactive, ett and ogt in place    PULM: Coarse b/l    CVS: sinus/sinus aparna    ABD: Soft, nondistended, nontender, +bs    EXT: No edema, nontender    SKIN: Warm     NEURO: sedated on propofol, +myocloncic jerking, does not withdraw to noxious stimuli      Medications:  azithromycin  IVPB 500 milliGRAM(s) IV Intermittent every 24 hours  cefTRIAXone   IVPB 1000 milliGRAM(s) IV Intermittent every 24 hours  DOBUTamine Infusion 2.5 MICROgram(s)/kG/Min IV Continuous <Continuous>  norepinephrine Infusion 0.05 MICROgram(s)/kG/Min IV Continuous <Continuous>  albuterol/ipratropium for Nebulization 3 milliLiter(s) Nebulizer every 6 hours  sodium chloride 3%  Inhalation 4 milliLiter(s) Inhalation every 6 hours  midazolam Injectable 4 milliGRAM(s) IV Push every 2 hours PRN  propofol Infusion 10 MICROgram(s)/kG/Min IV Continuous <Continuous>  aspirin  chewable 81 milliGRAM(s) Chew daily  heparin   Injectable 5000 Unit(s) SubCutaneous every 8 hours  pantoprazole  Injectable 40 milliGRAM(s) IV Push daily  insulin lispro (ADMELOG) corrective regimen sliding scale   SubCutaneous every 6 hours  lactated ringers. 500 milliLiter(s) IV Continuous <Continuous>  influenza  Vaccine (HIGH DOSE) 0.7 milliLiter(s) IntraMuscular once  chlorhexidine 0.12% Liquid 15 milliLiter(s) Oral Mucosa every 12 hours  chlorhexidine 2% Cloths 1 Application(s) Topical <User Schedule>      Mode: AC/ CMV (Assist Control/ Continuous Mandatory Ventilation)  RR (machine): 24  TV (machine): 480  FiO2: 50  PEEP: 5  ITime: 1  MAP: 13  PIP: 34      ICU Vital Signs Last 24 Hrs  T(C): 36.3 (08 Dec 2023 22:00), Max: 37.8 (08 Dec 2023 09:10)  T(F): 97.3 (08 Dec 2023 22:00), Max: 100 (08 Dec 2023 09:10)  HR: 58 (08 Dec 2023 22:00) (54 - 89)  BP: 111/50 (08 Dec 2023 16:00) (101/89 - 173/70)  BP(mean): 69 (08 Dec 2023 16:00) (66 - 95)  ABP: 125/46 (08 Dec 2023 22:00) (108/38 - 134/56)  ABP(mean): 73 (08 Dec 2023 22:00) (46 - 83)  RR: 24 (08 Dec 2023 22:00) (13 - 30)  SpO2: 98% (08 Dec 2023 22:00) (92% - 100%)    O2 Parameters below as of 08 Dec 2023 19:18  Patient On (Oxygen Delivery Method): ventilator      Vital Signs Last 24 Hrs  T(C): 36.3 (08 Dec 2023 22:00), Max: 37.8 (08 Dec 2023 09:10)  T(F): 97.3 (08 Dec 2023 22:00), Max: 100 (08 Dec 2023 09:10)  HR: 58 (08 Dec 2023 22:00) (54 - 89)  BP: 111/50 (08 Dec 2023 16:00) (101/89 - 173/70)  BP(mean): 69 (08 Dec 2023 16:00) (66 - 95)  RR: 24 (08 Dec 2023 22:00) (13 - 30)  SpO2: 98% (08 Dec 2023 22:00) (92% - 100%)    Parameters below as of 08 Dec 2023 19:18  Patient On (Oxygen Delivery Method): ventilator      ABG - ( 08 Dec 2023 21:25 )  pH, Arterial: 7.33  pH, Blood: x     /  pCO2: 38    /  pO2: 98    / HCO3: 20    / Base Excess: -5.5  /  SaO2: 97.9        I&O's Detail    07 Dec 2023 07:01  -  08 Dec 2023 07:00  --------------------------------------------------------  IN:    Oral Fluid: 50 mL    sodium chloride 0.9%: 450 mL  Total IN: 500 mL    OUT:  Total OUT: 0 mL  Total NET: 500 mL      08 Dec 2023 07:01  -  08 Dec 2023 22:25  --------------------------------------------------------  IN:    IV PiggyBack: 350 mL    Lactated Ringers: 500 mL    Nepro: 20 mL    Norepinephrine: 300.6 mL    Propofol: 307.1 mL  Total IN: 1477.7 mL    OUT:    Indwelling Catheter - Urethral (mL): 25 mL    Voided (mL): 0 mL  Total OUT: 25 mL  Total NET: 1452.7 mL      LABS:                        9.7    11.68 )-----------( 144      ( 08 Dec 2023 21:36 )             29.4     12-08    132<L>  |  101  |  54<H>  ----------------------------<  164<H>  4.1   |  20<L>  |  2.97<H>    Ca    7.7<L>      08 Dec 2023 21:36  Phos  5.8     12-08  Mg     2.6     12-08    TPro  6.2  /  Alb  2.1<L>  /  TBili  0.4  /  DBili  x   /  AST  1268<H>  /  ALT  868<H>  /  AlkPhos  81  12-08      CARDIAC MARKERS ( 07 Dec 2023 17:30 )  x     / x     / 962 U/L / x     / 14.3 ng/mL      CAPILLARY BLOOD GLUCOSE  POCT Blood Glucose.: 157 mg/dL (08 Dec 2023 17:07)      PT/INR - ( 08 Dec 2023 11:50 )   PT: 12.5 sec;   INR: 1.05 ratio     PTT - ( 08 Dec 2023 11:50 )  PTT:29.9 sec    Urinalysis Basic - ( 08 Dec 2023 21:36 )  Color: x / Appearance: x / SG: x / pH: x  Gluc: 164 mg/dL / Ketone: x  / Bili: x / Urobili: x   Blood: x / Protein: x / Nitrite: x   Leuk Esterase: x / RBC: x / WBC x   Sq Epi: x / Non Sq Epi: x / Bacteria: x      CULTURES:  pending      RADIOLOGY:  < from: CT Angio Chest PE Protocol w/ IV Cont (12.07.23 @ 15:03) >    ACC: [id number] EXAM:  CT ANGIO CHEST PULM ART [facility]   ORDERED BY: PETER LOZA     PROCEDURE DATE:  12/07/2023      INTERPRETATION:  HISTORY: Shortness of breath. Hypoxia.    EXAMINATION: CTA CHEST was performed for evaluation of the pulmonary   arteries. Multiplanar reformatted images and MIPS were acquired.  CONTRAST/COMPLICATIONS:  IV Contrast: Omnipaque 350  90 cc administered   10 cc discarded  Oral Contrast: NONE  Complications: None reported at time of study completion    COMPARISON: None available.    FINDINGS:    PULMONARY ARTERIES: many of the segmental and subsegmental pulmonary   branches are limited in assessment secondary to motion. No pulmonary   embolism detected.    MEDIASTINUM: Normal heart size. No pericardial effusion. Thoracic aorta   normal caliber.  No large mediastinal lymph nodes.    AIRWAYS, LUNGS, PLEURA: Patchy and ill-defined consolidative opacities   involving the bilateral upper and lower lobes. No pleural effusion.    IMAGED ABDOMEN: Unremarkable.    SOFT TISSUES and NECK: Enlarged left thyroid lobe with nodule.    BONES: Unremarkable.      IMPRESSION:.    No pulmonary embolism detected. Please note that many of the segmental   and subsegmental pulmonary arterial branches are not adequately assessed   due to motion.    Findings compatible with multifocal pneumonia. Recommend CT chest   follow-up in 1-3 months after treatment to ensure clearing.    --- End of Report ---    HOLLY MOORE MD; Attending Radiologist  This document has beenelectronically signed. Dec  7 2023  3:21PM    < end of copied text >

## 2023-12-08 NOTE — CONSULT NOTE ADULT - SUBJECTIVE AND OBJECTIVE BOX
CHIEF COMPLAINT:    HPI:  87 year old female PMH HTN BIBEMS accompanied by granddaughter c/o SOB x 4 days a/w cough w/ white phlegm. Per granddaughter, other household members with URI symptoms. Per EMS, pt wit audible wheezing - denies hx asthma / COPD - SPO2 80s on RA, improved to 90s s/p breathing treatment x 2. Pt denies CP, LE swelling. Per granddaughter w/o hx lung or heart disease. No prior history of CHF, no prior cardiac stents.  (07 Dec 2023 17:29)    Brief Hospital Course:  Pt admitted overnight to medicine w/ CAP found to be positive for RSV. Pt placed on BIPAP intermentently for         PAST MEDICAL & SURGICAL HISTORY:  Hypertension      H/O cardiomyopathy      No significant past surgical history          FAMILY HISTORY:  No pertinent family history in first degree relatives        SOCIAL HISTORY:  Smoking: __ packs x ___ years  EtOH Use:  Marital Status:  Occupation:  Recent Travel:  Country of Birth:  Advance Directives:    Allergies    oxycodone (Anaphylaxis)    Intolerances        HOME MEDICATIONS:    REVIEW OF SYSTEMS:  Constitutional:   Eyes:  ENT:  CV:  Resp:  GI:  :  MSK:  Integumentary:  Neurological:  Psychiatric:  Endocrine:  Hematologic/Lymphatic:  Allergic/Immunologic:  [ ] All other systems negative  [ ] Unable to assess ROS because ________    OBJECTIVE:  ICU Vital Signs Last 24 Hrs  T(C): 37.4 (08 Dec 2023 10:30), Max: 39.7 (07 Dec 2023 12:55)  T(F): 99.3 (08 Dec 2023 10:30), Max: 103.5 (07 Dec 2023 12:55)  HR: 70 (08 Dec 2023 11:51) (70 - 110)  BP: 121/72 (08 Dec 2023 10:30) (101/89 - 189/89)  BP(mean): 87 (08 Dec 2023 10:30) (75 - 95)  ABP: --  ABP(mean): --  RR: 24 (08 Dec 2023 10:30) (17 - 30)  SpO2: 100% (08 Dec 2023 11:51) (92% - 100%)    O2 Parameters below as of 08 Dec 2023 06:31  Patient On (Oxygen Delivery Method): BiPAP/CPAP          Mode: AC/ CMV (Assist Control/ Continuous Mandatory Ventilation), RR (machine): 16, TV (machine): 450, FiO2: 100, PEEP: 5, ITime: 1, MAP: 11, PIP: 37    12-07 @ 07:01  -  12-08 @ 07:00  --------------------------------------------------------  IN: 500 mL / OUT: 0 mL / NET: 500 mL    12-08 @ 07:01  -  12-08 @ 11:55  --------------------------------------------------------  IN: 28 mL / OUT: 0 mL / NET: 28 mL      CAPILLARY BLOOD GLUCOSE      POCT Blood Glucose.: 314 mg/dL (08 Dec 2023 11:21)      PHYSICAL EXAM:  General:   HEENT:   Lymph Nodes:  Neck:   Respiratory:   Cardiovascular:   Abdomen:   Extremities:   Skin:   Neurological:  Psychiatry:    HOSPITAL MEDICATIONS:  MEDICATIONS  (STANDING):  albuterol/ipratropium for Nebulization 3 milliLiter(s) Nebulizer every 6 hours  aspirin  chewable 81 milliGRAM(s) Chew daily  azithromycin  IVPB 500 milliGRAM(s) IV Intermittent every 24 hours  cefTRIAXone   IVPB 1000 milliGRAM(s) IV Intermittent every 24 hours  chlorhexidine 0.12% Liquid 15 milliLiter(s) Oral Mucosa every 12 hours  chlorhexidine 2% Cloths 1 Application(s) Topical <User Schedule>  heparin   Injectable 5000 Unit(s) SubCutaneous every 8 hours  influenza  Vaccine (HIGH DOSE) 0.7 milliLiter(s) IntraMuscular once  insulin lispro (ADMELOG) corrective regimen sliding scale   SubCutaneous every 6 hours  norepinephrine Infusion 0.05 MICROgram(s)/kG/Min (9.41 mL/Hr) IV Continuous <Continuous>  pantoprazole  Injectable 40 milliGRAM(s) IV Push daily  propofol Infusion 10 MICROgram(s)/kG/Min (6.02 mL/Hr) IV Continuous <Continuous>  sodium chloride 3%  Inhalation 4 milliLiter(s) Inhalation every 6 hours    MEDICATIONS  (PRN):  midazolam Injectable 2 milliGRAM(s) IV Push every 4 hours PRN agitation      LABS:                        13.0   11.65 )-----------( 157      ( 07 Dec 2023 12:50 )             40.1     12-08    131<L>  |  98  |  40<H>  ----------------------------<  398<H>  3.7   |  25  |  2.14<H>    Ca    7.2<L>      08 Dec 2023 07:28  Phos  6.3     12-08  Mg     2.5     12-08    TPro  5.5<L>  /  Alb  1.9<L>  /  TBili  0.3  /  DBili  x   /  AST  735<H>  /  ALT  472<H>  /  AlkPhos  61  12-08      Urinalysis Basic - ( 08 Dec 2023 07:28 )    Color: x / Appearance: x / SG: x / pH: x  Gluc: 398 mg/dL / Ketone: x  / Bili: x / Urobili: x   Blood: x / Protein: x / Nitrite: x   Leuk Esterase: x / RBC: x / WBC x   Sq Epi: x / Non Sq Epi: x / Bacteria: x      Arterial Blood Gas:  12-08 @ 09:25  7.24/47/180/20/98.2/-7.1  ABG lactate: --  Arterial Blood Gas:  12-07 @ 20:53  7.27/44/70/20/92.9/-6.6  ABG lactate: --    Venous Blood Gas:  12-07 @ 14:48  7.27/46/47/21/69.3  VBG Lactate: --      MICROBIOLOGY:     RADIOLOGY:  [ ] Reviewed and interpreted by me    EKG: CHIEF COMPLAINT:    HPI:  87 year old female PMH HTN BIBEMS accompanied by granddaughter c/o SOB x 4 days a/w cough w/ white phlegm. Per granddaughter, other household members with URI symptoms. Per EMS, pt wit audible wheezing - denies hx asthma / COPD - SPO2 80s on RA, improved to 90s s/p breathing treatment x 2. Pt denies CP, LE swelling. Per granddaughter w/o hx lung or heart disease. No prior history of CHF, no prior cardiac stents.  (07 Dec 2023 17:29)    Brief Hospital Course:  Pt admitted overnight to medicine w/ CAP found to be positive for RSV. Pt placed on BIPAP intermittently overnight for respiratory support. RRT called this morning after pt was found unresponsive on bipap. Subsequently found to be pulseless and  code blue called. ACLS initated at 714AM. Initial rhythm was PEA. 4 epi and 1 bicarb given. Pt intubated successfully with heavy amount of secretions suctioned from the airway after intubation. Pt got ROSC at 726 AM. Levophed started. Pt then lost pulse again and ACLS restarted at 7:43am w/ PEA rhythm. ROSC achieved at 749am. EKG w/out signs of ischemia Pt transferred to ICU for further management.       PAST MEDICAL & SURGICAL HISTORY:  Hypertension      H/O cardiomyopathy      No significant past surgical history          FAMILY HISTORY:  No pertinent family history in first degree relatives        SOCIAL HISTORY:  No hx of smoking per chart review     Allergies    oxycodone (Anaphylaxis)    Intolerances        HOME MEDICATIONS:    REVIEW OF SYSTEMS:  [x ] Unable to assess ROS, pt unresponsive     OBJECTIVE:  ICU Vital Signs Last 24 Hrs  T(C): 37.4 (08 Dec 2023 10:30), Max: 39.7 (07 Dec 2023 12:55)  T(F): 99.3 (08 Dec 2023 10:30), Max: 103.5 (07 Dec 2023 12:55)  HR: 70 (08 Dec 2023 11:51) (70 - 110)  BP: 121/72 (08 Dec 2023 10:30) (101/89 - 189/89)  BP(mean): 87 (08 Dec 2023 10:30) (75 - 95)  ABP: --  ABP(mean): --  RR: 24 (08 Dec 2023 10:30) (17 - 30)  SpO2: 100% (08 Dec 2023 11:51) (92% - 100%)    O2 Parameters below as of 08 Dec 2023 06:31  Patient On (Oxygen Delivery Method): BiPAP/CPAP          Mode: AC/ CMV (Assist Control/ Continuous Mandatory Ventilation), RR (machine): 16, TV (machine): 450, FiO2: 100, PEEP: 5, ITime: 1, MAP: 11, PIP: 37    12-07 @ 07:01  -  12-08 @ 07:00  --------------------------------------------------------  IN: 500 mL / OUT: 0 mL / NET: 500 mL    12-08 @ 07:01  -  12-08 @ 11:55  --------------------------------------------------------  IN: 28 mL / OUT: 0 mL / NET: 28 mL      CAPILLARY BLOOD GLUCOSE      POCT Blood Glucose.: 314 mg/dL (08 Dec 2023 11:21)      PHYSICAL EXAM:  GENERAL: unable to assess pt unresponsive s/p cardiac arrest    HOSPITAL MEDICATIONS:  MEDICATIONS  (STANDING):  albuterol/ipratropium for Nebulization 3 milliLiter(s) Nebulizer every 6 hours  aspirin  chewable 81 milliGRAM(s) Chew daily  azithromycin  IVPB 500 milliGRAM(s) IV Intermittent every 24 hours  cefTRIAXone   IVPB 1000 milliGRAM(s) IV Intermittent every 24 hours  chlorhexidine 0.12% Liquid 15 milliLiter(s) Oral Mucosa every 12 hours  chlorhexidine 2% Cloths 1 Application(s) Topical <User Schedule>  heparin   Injectable 5000 Unit(s) SubCutaneous every 8 hours  influenza  Vaccine (HIGH DOSE) 0.7 milliLiter(s) IntraMuscular once  insulin lispro (ADMELOG) corrective regimen sliding scale   SubCutaneous every 6 hours  norepinephrine Infusion 0.05 MICROgram(s)/kG/Min (9.41 mL/Hr) IV Continuous <Continuous>  pantoprazole  Injectable 40 milliGRAM(s) IV Push daily  propofol Infusion 10 MICROgram(s)/kG/Min (6.02 mL/Hr) IV Continuous <Continuous>  sodium chloride 3%  Inhalation 4 milliLiter(s) Inhalation every 6 hours    MEDICATIONS  (PRN):  midazolam Injectable 2 milliGRAM(s) IV Push every 4 hours PRN agitation      LABS:                        13.0   11.65 )-----------( 157      ( 07 Dec 2023 12:50 )             40.1     12-08    131<L>  |  98  |  40<H>  ----------------------------<  398<H>  3.7   |  25  |  2.14<H>    Ca    7.2<L>      08 Dec 2023 07:28  Phos  6.3     12-08  Mg     2.5     12-08    TPro  5.5<L>  /  Alb  1.9<L>  /  TBili  0.3  /  DBili  x   /  AST  735<H>  /  ALT  472<H>  /  AlkPhos  61  12-08      Urinalysis Basic - ( 08 Dec 2023 07:28 )    Color: x / Appearance: x / SG: x / pH: x  Gluc: 398 mg/dL / Ketone: x  / Bili: x / Urobili: x   Blood: x / Protein: x / Nitrite: x   Leuk Esterase: x / RBC: x / WBC x   Sq Epi: x / Non Sq Epi: x / Bacteria: x      Arterial Blood Gas:  12-08 @ 09:25  7.24/47/180/20/98.2/-7.1  ABG lactate: --  Arterial Blood Gas:  12-07 @ 20:53  7.27/44/70/20/92.9/-6.6  ABG lactate: --    Venous Blood Gas:  12-07 @ 14:48  7.27/46/47/21/69.3  VBG Lactate: --      MICROBIOLOGY:     RADIOLOGY:  [x] Reviewed and interpreted by me    EKG: Reviewed by me CHIEF COMPLAINT:    HPI:  87 year old female PMH HTN BIBEMS accompanied by granddaughter c/o SOB x 4 days a/w cough w/ white phlegm. Per granddaughter, other household members with URI symptoms. Per EMS, pt wit audible wheezing - denies hx asthma / COPD - SPO2 80s on RA, improved to 90s s/p breathing treatment x 2. Pt denies CP, LE swelling. Per granddaughter w/o hx lung or heart disease. No prior history of CHF, no prior cardiac stents.  (07 Dec 2023 17:29)    Brief Hospital Course:  Pt admitted overnight to medicine w/ CAP found to be positive for RSV. Pt placed on BIPAP intermittently overnight for respiratory support. RRT called this morning after pt was found unresponsive on bipap. Subsequently found to be pulseless and  code blue called. ACLS initated at 714AM. Initial rhythm was PEA. 4 epi and 1 bicarb given. Pt intubated successfully with heavy amount of secretions suctioned from the airway after intubation. Pt got ROSC at 726 AM. Levophed started. Pt then lost pulse again and ACLS restarted at 7:43am w/ PEA rhythm. ROSC achieved at 749am. EKG w/out signs of ischemia Pt transferred to ICU for further management.       PAST MEDICAL & SURGICAL HISTORY:  Hypertension  H/O cardiomyopathy  No significant past surgical history        FAMILY HISTORY:  No pertinent family history in first degree relatives        SOCIAL HISTORY:  No hx of smoking per chart review     Allergies: oxycodone (Anaphylaxis)      REVIEW OF SYSTEMS:  [x ] Unable to assess ROS, pt unresponsive     OBJECTIVE:  ICU Vital Signs Last 24 Hrs  T(C): 37.4 (08 Dec 2023 10:30), Max: 39.7 (07 Dec 2023 12:55)  T(F): 99.3 (08 Dec 2023 10:30), Max: 103.5 (07 Dec 2023 12:55)  HR: 70 (08 Dec 2023 11:51) (70 - 110)  BP: 121/72 (08 Dec 2023 10:30) (101/89 - 189/89)  BP(mean): 87 (08 Dec 2023 10:30) (75 - 95)  ABP: --  ABP(mean): --  RR: 24 (08 Dec 2023 10:30) (17 - 30)  SpO2: 100% (08 Dec 2023 11:51) (92% - 100%)    O2 Parameters below as of 08 Dec 2023 06:31  Patient On (Oxygen Delivery Method): BiPAP/CPAP      Mode: AC/ CMV (Assist Control/ Continuous Mandatory Ventilation), RR (machine): 16, TV (machine): 450, FiO2: 100, PEEP: 5, ITime: 1, MAP: 11, PIP: 37      12-07 @ 07:01 - 12-08 @ 07:00  --------------------------------------------------------  IN: 500 mL / OUT: 0 mL / NET: 500 mL    12-08 @ 07:01  -  12-08 @ 11:55  --------------------------------------------------------  IN: 28 mL / OUT: 0 mL / NET: 28 mL      CAPILLARY BLOOD GLUCOSE  POCT Blood Glucose.: 314 mg/dL (08 Dec 2023 11:21)      PHYSICAL EXAM:  GENERAL: unable to assess pt unresponsive s/p cardiac arrest. Intubated   NEURO: evident myoclonus on exam, moves extremities, not opening eyes or following commands  PULM: Coarse to auscultation bilaterally.  CVS: Regular rate and rhythm, no murmurs, rubs, or gallops.  ABD: Obese, soft, nondistended, no masses.  EXT: Trace edema.  SKIN: cool extremities, no rashes.    HOSPITAL MEDICATIONS:  MEDICATIONS  (STANDING):  albuterol/ipratropium for Nebulization 3 milliLiter(s) Nebulizer every 6 hours  aspirin  chewable 81 milliGRAM(s) Chew daily  azithromycin  IVPB 500 milliGRAM(s) IV Intermittent every 24 hours  cefTRIAXone   IVPB 1000 milliGRAM(s) IV Intermittent every 24 hours  chlorhexidine 0.12% Liquid 15 milliLiter(s) Oral Mucosa every 12 hours  chlorhexidine 2% Cloths 1 Application(s) Topical <User Schedule>  heparin   Injectable 5000 Unit(s) SubCutaneous every 8 hours  influenza  Vaccine (HIGH DOSE) 0.7 milliLiter(s) IntraMuscular once  insulin lispro (ADMELOG) corrective regimen sliding scale   SubCutaneous every 6 hours  norepinephrine Infusion 0.05 MICROgram(s)/kG/Min (9.41 mL/Hr) IV Continuous <Continuous>  pantoprazole  Injectable 40 milliGRAM(s) IV Push daily  propofol Infusion 10 MICROgram(s)/kG/Min (6.02 mL/Hr) IV Continuous <Continuous>  sodium chloride 3%  Inhalation 4 milliLiter(s) Inhalation every 6 hours    MEDICATIONS  (PRN):  midazolam Injectable 2 milliGRAM(s) IV Push every 4 hours PRN agitation      LABS:                        13.0   11.65 )-----------( 157      ( 07 Dec 2023 12:50 )             40.1     12-08    131<L>  |  98  |  40<H>  ----------------------------<  398<H>  3.7   |  25  |  2.14<H>    Ca    7.2<L>      08 Dec 2023 07:28  Phos  6.3     12-08  Mg     2.5     12-08    TPro  5.5<L>  /  Alb  1.9<L>  /  TBili  0.3  /  DBili  x   /  AST  735<H>  /  ALT  472<H>  /  AlkPhos  61  12-08      Urinalysis Basic - ( 08 Dec 2023 07:28 )    Color: x / Appearance: x / SG: x / pH: x  Gluc: 398 mg/dL / Ketone: x  / Bili: x / Urobili: x   Blood: x / Protein: x / Nitrite: x   Leuk Esterase: x / RBC: x / WBC x   Sq Epi: x / Non Sq Epi: x / Bacteria: x      Arterial Blood Gas:  12-08 @ 09:25  7.24/47/180/20/98.2/-7.1  ABG lactate: --  Arterial Blood Gas:  12-07 @ 20:53  7.27/44/70/20/92.9/-6.6  ABG lactate: --    Venous Blood Gas:  12-07 @ 14:48  7.27/46/47/21/69.3  VBG Lactate: --      RADIOLOGY:  [x] Reviewed and interpreted by me  EKG: Reviewed by me

## 2023-12-08 NOTE — CHART NOTE - NSCHARTNOTEFT_GEN_A_CORE
Overhead RRT called x2 for CODE BLUE.    Patient admitted overnight with RSV c/b superimposed CAP. CODE BLUE called with successful ROSC x1, unable to reach family during first CODE. During post-code stabilization patient's status deteriorated and a second CODE BLUE was called. Patient's daughter, Mohit Grijalva was successfully contacted @ [phone], and informed of above. I attempted to establish GOC, however, [place] was very overwhelmed by the news and hung up prior to establishing GOC. Family planning to come to hospital urgently.    RANDOLPH Escobar-C  Medicine ACP Overhead RRT called x2 for CODE BLUE.    Patient admitted overnight with RSV c/b superimposed CAP. CODE BLUE called with successful ROSC x1, unable to reach family during first CODE. During post-code stabilization patient's status deteriorated and a second CODE BLUE was called. Patient's daughter, Mohit Grijalva was successfully contacted @ [phone], and informed of above. I attempted to establish GOC, however, [place] was very overwhelmed by the news and hung up prior to establishing GOC. Family planning to come to hospital urgently.    RANDOLPH Escobar-C  Medicine ACP

## 2023-12-08 NOTE — PROCEDURE NOTE - NSICDXPROBLEMMLMBOX_GEN
IPSTART~^~1~^~[id number]~^~~^~IPEND  PKSTART~^~[id number]~^~PKEND   IPSTART~^~1~^~[id number]~^~~^~IPEND  PKSTART~^~[id number]~^~PKEND

## 2023-12-08 NOTE — PROGRESS NOTE ADULT - ASSESSMENT
88 yo f pmhx HTN, moderate to severe AS admitted with hypoxic respiratory failure in setting of RSV requiring NIPPV with course c/b cardiac arrest (ROSC achieved ~16mins) now in [facility].      NEURO: +seizures on Keppra, propofol.  versed ivp prn.  further diagnostic imaging or am  CV: Mixed shock state requiring vasopressor therapy, actively titrating levophed for map >65.  Dobutamine added for additional inotropic support/promote renal perfusion.    RESP: Hypoxic/hypercapnic respiratory failrue, ac/vc 4-6 cc/kg tv lung protective strategies, actively titrating fio2/peep for spo2 >92%.  inh bronchodilators prn.  chest pt, pulm lavage and suctioning as tolerated.    RENAL: GAUTAM- anuric.  Avoid nephrotoxic meds, renally dose meds, trend urine output, bun/cr and electrolytes.  replace as needed. whalen in place.  bladder scan revealing scant to absent urine.   GI: NPO on tf. shock liver, trend LFTs, avoid hepatotoxic meds.   ENDO: ISS for glycemic control   ID: zithromax and rocephin for coverage  HEME: Heparin for vte ppx   DISPO: Full code.  On going goc with patient's family. likely poor prognosis.      Critical Care time: 50 mins assessing presenting problems of acute illness that poses high probability of life threatening deterioration or end organ damage/dysfunction.  Medical decision making including Initiating plan of care, reviewing data, reviewing radiology, discussing with multidisciplinary team, non inclusive of procedures, discussing goals of care with patient/family

## 2023-12-09 NOTE — PROGRESS NOTE ADULT - SUBJECTIVE AND OBJECTIVE BOX
Patient is a 87y old  Female who presents with a chief complaint of SOB from CAP (09 Dec 2023 12:18)    HPI:  87 year old female PMH HTN BIBEMS accompanied by granddaughter c/o SOB x 4 days a/w cough w/ white phlegm. Per granddaughter, other household members with URI symptoms. Per EMS, pt wit audible wheezing - denies hx asthma / COPD - SPO2 80s on RA, improved to 90s s/p breathing treatment x 2. Pt denies CP, LE swelling. Per granddaughter w/o hx lung or heart disease. No prior history of CHF, no prior cardiac stents.  (07 Dec 2023 17:29)    Allergies: oxycodone    PAST MEDICAL & SURGICAL HISTORY:  Hypertension      H/O cardiomyopathy      No significant past surgical history        FAMILY HISTORY:  No pertinent family history in first degree relatives      SOCIAL HISTORY:    Home Medications:    Review of Systems:  Pertinent positives noted above, all other ROS negative x10 system    T(F): 98.6 (12-09-23 @ 19:30), Max: 98.6 (12-09-23 @ 19:30)  HR: 68 (12-09-23 @ 19:30) (54 - 74)  BP: --  RR: 30 (12-09-23 @ 19:30) (16 - 32)  SpO2: 98% (12-09-23 @ 19:30)  Wt(kg): --  Mode: AC/ CMV (Assist Control/ Continuous Mandatory Ventilation), RR (machine): 30, TV (machine): 480, FiO2: 50, PEEP: 5  CAPILLARY BLOOD GLUCOSE      POCT Blood Glucose.: 138 mg/dL (09 Dec 2023 17:05)    I&O's Summary    08 Dec 2023 07:01  -  09 Dec 2023 07:00  --------------------------------------------------------  IN: 2104.8 mL / OUT: 90 mL / NET: 2014.8 mL    09 Dec 2023 07:01  -  09 Dec 2023 20:16  --------------------------------------------------------  IN: 1731.9 mL / OUT: 100 mL / NET: 1631.9 mL        Physical Exam:     Gen:  Neuro:  HEENT:  CVS:  Resp:  Abd:  Ext:  Skin:    Meds:  azithromycin  IVPB 500 milliGRAM(s) IV Intermittent every 24 hours  cefTRIAXone   IVPB 1000 milliGRAM(s) IV Intermittent every 24 hours    norepinephrine Infusion 0.05 MICROgram(s)/kG/Min IV Continuous <Continuous>     insulin lispro (ADMELOG) corrective regimen sliding scale   SubCutaneous every 6 hours     albuterol/ipratropium for Nebulization 3 milliLiter(s) Nebulizer every 6 hours     levETIRAcetam  IVPB 500 milliGRAM(s) IV Intermittent every 12 hours  midazolam Injectable 4 milliGRAM(s) IV Push every 2 hours PRN  propofol Infusion 10 MICROgram(s)/kG/Min IV Continuous <Continuous>        aspirin  chewable 81 milliGRAM(s) Chew daily  heparin   Injectable 5000 Unit(s) SubCutaneous every 8 hours              influenza  Vaccine (HIGH DOSE) 0.7 milliLiter(s) IntraMuscular once     chlorhexidine 0.12% Liquid 15 milliLiter(s) Oral Mucosa every 12 hours  chlorhexidine 2% Cloths 1 Application(s) Topical <User Schedule>                              9.5    11.57 )-----------( 143      ( 09 Dec 2023 04:10 )             29.0       12-09    131<L>  |  98  |  61<H>  ----------------------------<  151<H>  3.6   |  20<L>  |  3.64<H>    Ca    7.9<L>      09 Dec 2023 14:20  Phos  5.9     12-09  Mg     2.7     12-09    TPro  6.1  /  Alb  1.9<L>  /  TBili  0.6  /  DBili  x   /  AST  519<H>  /  ALT  631<H>  /  AlkPhos  89  12-09    Lactate 1.0           12-09 @ 14:20          PT/INR - ( 08 Dec 2023 11:50 )   PT: 12.5 sec;   INR: 1.05 ratio         PTT - ( 08 Dec 2023 11:50 )  PTT:29.9 sec  Urinalysis Basic - ( 09 Dec 2023 14:20 )    Color: x / Appearance: x / SG: x / pH: x  Gluc: 151 mg/dL / Ketone: x  / Bili: x / Urobili: x   Blood: x / Protein: x / Nitrite: x   Leuk Esterase: x / RBC: x / WBC x   Sq Epi: x / Non Sq Epi: x / Bacteria: x      ET Tube ET Tube   No growth   Rare polymorphonuclear leukocytes per low power field  No Squamous epithelial cells per low power field  No organisms seen per oil power field 12-08 @ 16:00  .Blood Blood-Peripheral   No growth at 24 hours -- 12-07 @ 13:00      Rapid RVP Result: Detected (12-07 @ 12:50)    ABG - ( 09 Dec 2023 14:39 )  pH, Arterial: 7.31  pH, Blood: x     /  pCO2: 38    /  pO2: 94    / HCO3: 19    / Base Excess: -6.6  /  SaO2: 98.2              Radiology:   < from: CT Angio Chest PE Protocol w/ IV Cont (12.07.23 @ 15:03) >    ACC: [id number] EXAM:  CT ANGIO CHEST PULM [facility]   ORDERED BY: PETER LOZA     PROCEDURE DATE:  12/07/2023          INTERPRETATION:  HISTORY: Shortness of breath. Hypoxia.    EXAMINATION: CTA CHEST was performed for evaluation of the pulmonary   arteries. Multiplanar reformatted images and MIPS were acquired.  CONTRAST/COMPLICATIONS:  IV Contrast: Omnipaque 350  90 cc administered   10 cc discarded  Oral Contrast: NONE  Complications: None reported at time of study completion    COMPARISON: None available.    FINDINGS:    PULMONARY ARTERIES: many of the segmental and subsegmental pulmonary   branches are limited in assessment secondary to motion. No pulmonary   embolism detected.    MEDIASTINUM: Normal heart size. No pericardial effusion. Thoracic aorta   normal caliber.  No large mediastinal lymph nodes.    AIRWAYS, LUNGS, PLEURA: Patchy and ill-defined consolidative opacities   involving the bilateral upper and lower lobes. No pleural effusion.    IMAGED ABDOMEN: Unremarkable.    SOFT TISSUES and NECK: Enlarged left thyroid lobe with nodule.    BONES: Unremarkable.      IMPRESSION:.    No pulmonary embolism detected. Please note that many of the segmental   and subsegmental pulmonary arterial branches are not adequately assessed   due to motion.    Findings compatible with multifocal pneumonia. Recommend CT chest   follow-up in 1-3 months after treatment to ensure clearing.    --- End of Report ---            HOLLY MOORE MD; Attending Radiologist  This document has beenelectronically signed. Dec  7 2023  3:21PM    < end of copied text >      Problems    -Metabolic encephalopathy secondary to hypercapnia  -  -  -  -  -      Assessment/Plan:    Time spent on this patient encounter, which includes documenting this note in the electronic medical record, was 42 minutes including assessing the presenting problems with associated risks, reviewing the medical record to prepare for the encounter, and meeting face to face with the patient to obtain additional history.  I have also performed an appropriate physical exam, made interventions listed and ordered and interpreted appropriate diagnostic studies as documented.     To improve communication and patient safety, I have coordinated care with the multidisciplinary team including the bedside nurse, appropriate attending of record and consultants as needed.      Date of entry of this note is equal to the date of services rendered   Patient is a 87y old  Female who presents with a chief complaint of SOB from CAP (09 Dec 2023 12:18)    HPI:  87 year old female PMH HTN BIBEMS accompanied by granddaughter c/o SOB x 4 days a/w cough w/ white phlegm. Per granddaughter, other household members with URI symptoms. Per EMS, pt wit audible wheezing - denies hx asthma / COPD - SPO2 80s on RA, improved to 90s s/p breathing treatment x 2. Pt denies CP, LE swelling. Per granddaughter w/o hx lung or heart disease. No prior history of CHF, no prior cardiac stents.  (07 Dec 2023 17:29)    Allergies: oxycodone    PAST MEDICAL & SURGICAL HISTORY:  Hypertension      H/O cardiomyopathy      No significant past surgical history        FAMILY HISTORY:  No pertinent family history in first degree relatives      SOCIAL HISTORY:    Home Medications:    Review of Systems:  Pertinent positives noted above, all other ROS negative x10 system    T(F): 98.6 (12-09-23 @ 19:30), Max: 98.6 (12-09-23 @ 19:30)  HR: 68 (12-09-23 @ 19:30) (54 - 74)  BP: --  RR: 30 (12-09-23 @ 19:30) (16 - 32)  SpO2: 98% (12-09-23 @ 19:30)  Wt(kg): --  Mode: AC/ CMV (Assist Control/ Continuous Mandatory Ventilation), RR (machine): 30, TV (machine): 480, FiO2: 50, PEEP: 5  CAPILLARY BLOOD GLUCOSE      POCT Blood Glucose.: 138 mg/dL (09 Dec 2023 17:05)    I&O's Summary    08 Dec 2023 07:01  -  09 Dec 2023 07:00  --------------------------------------------------------  IN: 2104.8 mL / OUT: 90 mL / NET: 2014.8 mL    09 Dec 2023 07:01  -  09 Dec 2023 20:16  --------------------------------------------------------  IN: 1731.9 mL / OUT: 100 mL / NET: 1631.9 mL        Physical Exam:     Gen:  Neuro:  HEENT:  CVS:  Resp:  Abd:  Ext:  Skin:    Meds:  azithromycin  IVPB 500 milliGRAM(s) IV Intermittent every 24 hours  cefTRIAXone   IVPB 1000 milliGRAM(s) IV Intermittent every 24 hours    norepinephrine Infusion 0.05 MICROgram(s)/kG/Min IV Continuous <Continuous>     insulin lispro (ADMELOG) corrective regimen sliding scale   SubCutaneous every 6 hours     albuterol/ipratropium for Nebulization 3 milliLiter(s) Nebulizer every 6 hours     levETIRAcetam  IVPB 500 milliGRAM(s) IV Intermittent every 12 hours  midazolam Injectable 4 milliGRAM(s) IV Push every 2 hours PRN  propofol Infusion 10 MICROgram(s)/kG/Min IV Continuous <Continuous>        aspirin  chewable 81 milliGRAM(s) Chew daily  heparin   Injectable 5000 Unit(s) SubCutaneous every 8 hours              influenza  Vaccine (HIGH DOSE) 0.7 milliLiter(s) IntraMuscular once     chlorhexidine 0.12% Liquid 15 milliLiter(s) Oral Mucosa every 12 hours  chlorhexidine 2% Cloths 1 Application(s) Topical <User Schedule>                              9.5    11.57 )-----------( 143      ( 09 Dec 2023 04:10 )             29.0       12-09    131<L>  |  98  |  61<H>  ----------------------------<  151<H>  3.6   |  20<L>  |  3.64<H>    Ca    7.9<L>      09 Dec 2023 14:20  Phos  5.9     12-09  Mg     2.7     12-09    TPro  6.1  /  Alb  1.9<L>  /  TBili  0.6  /  DBili  x   /  AST  519<H>  /  ALT  631<H>  /  AlkPhos  89  12-09    Lactate 1.0           12-09 @ 14:20          PT/INR - ( 08 Dec 2023 11:50 )   PT: 12.5 sec;   INR: 1.05 ratio         PTT - ( 08 Dec 2023 11:50 )  PTT:29.9 sec  Urinalysis Basic - ( 09 Dec 2023 14:20 )    Color: x / Appearance: x / SG: x / pH: x  Gluc: 151 mg/dL / Ketone: x  / Bili: x / Urobili: x   Blood: x / Protein: x / Nitrite: x   Leuk Esterase: x / RBC: x / WBC x   Sq Epi: x / Non Sq Epi: x / Bacteria: x      ET Tube ET Tube   No growth   Rare polymorphonuclear leukocytes per low power field  No Squamous epithelial cells per low power field  No organisms seen per oil power field 12-08 @ 16:00  .Blood Blood-Peripheral   No growth at 24 hours -- 12-07 @ 13:00      Rapid RVP Result: Detected (12-07 @ 12:50)    ABG - ( 09 Dec 2023 14:39 )  pH, Arterial: 7.31  pH, Blood: x     /  pCO2: 38    /  pO2: 94    / HCO3: 19    / Base Excess: -6.6  /  SaO2: 98.2              Radiology:   < from: CT Angio Chest PE Protocol w/ IV Cont (12.07.23 @ 15:03) >    ACC: [id number] EXAM:  CT ANGIO CHEST PULM [facility]   ORDERED BY: PETER LOZA     PROCEDURE DATE:  12/07/2023          INTERPRETATION:  HISTORY: Shortness of breath. Hypoxia.    EXAMINATION: CTA CHEST was performed for evaluation of the pulmonary   arteries. Multiplanar reformatted images and MIPS were acquired.  CONTRAST/COMPLICATIONS:  IV Contrast: Omnipaque 350  90 cc administered   10 cc discarded  Oral Contrast: NONE  Complications: None reported at time of study completion    COMPARISON: None available.    FINDINGS:    PULMONARY ARTERIES: many of the segmental and subsegmental pulmonary   branches are limited in assessment secondary to motion. No pulmonary   embolism detected.    MEDIASTINUM: Normal heart size. No pericardial effusion. Thoracic aorta   normal caliber.  No large mediastinal lymph nodes.    AIRWAYS, LUNGS, PLEURA: Patchy and ill-defined consolidative opacities   involving the bilateral upper and lower lobes. No pleural effusion.    IMAGED ABDOMEN: Unremarkable.    SOFT TISSUES and NECK: Enlarged left thyroid lobe with nodule.    BONES: Unremarkable.      IMPRESSION:.    No pulmonary embolism detected. Please note that many of the segmental   and subsegmental pulmonary arterial branches are not adequately assessed   due to motion.    Findings compatible with multifocal pneumonia. Recommend CT chest   follow-up in 1-3 months after treatment to ensure clearing.    --- End of Report ---            HOLLY MOORE MD; Attending Radiologist  This document has beenelectronically signed. Dec  7 2023  3:21PM    < end of copied text >      Problems    -Metabolic encephalopathy secondary to hypercapnia  -  -  -  -  -      Assessment/Plan:    Time spent on this patient encounter, which includes documenting this note in the electronic medical record, was 42 minutes including assessing the presenting problems with associated risks, reviewing the medical record to prepare for the encounter, and meeting face to face with the patient to obtain additional history.  I have also performed an appropriate physical exam, made interventions listed and ordered and interpreted appropriate diagnostic studies as documented.     To improve communication and patient safety, I have coordinated care with the multidisciplinary team including the bedside nurse, appropriate attending of record and consultants as needed.      Date of entry of this note is equal to the date of services rendered

## 2023-12-09 NOTE — PROGRESS NOTE ADULT - SUBJECTIVE AND OBJECTIVE BOX
CHIEF COMPLAINT: Cardia arrest    Interval Events: EEG shows myoclonic seizures. No other events overnight     REVIEW OF SYSTEMS:   Unable to perform ROS due to sedated      OBJECTIVE:  ICU Vital Signs Last 24 Hrs  T(C): 35.5 (09 Dec 2023 10:00), Max: 37.4 (08 Dec 2023 11:00)  T(F): 95.9 (09 Dec 2023 10:00), Max: 99.3 (08 Dec 2023 11:00)  HR: 68 (09 Dec 2023 10:00) (54 - 74)  BP: 111/50 (08 Dec 2023 16:00) (105/49 - 122/55)  BP(mean): 69 (08 Dec 2023 16:00) (66 - 75)  ABP: 118/43 (09 Dec 2023 10:00) (104/37 - 149/50)  ABP(mean): 67 (09 Dec 2023 10:00) (46 - 83)  RR: 26 (09 Dec 2023 10:00) (13 - 30)  SpO2: 98% (09 Dec 2023 10:00) (96% - 100%)    O2 Parameters below as of 08 Dec 2023 19:18  Patient On (Oxygen Delivery Method): ventilator          Mode: AC/ CMV (Assist Control/ Continuous Mandatory Ventilation), RR (machine): 30, TV (machine): 480, FiO2: 50, PEEP: 5, ITime: 1, MAP: 13, PIP: 37    12-08 @ 07:01  -  12-09 @ 07:00  --------------------------------------------------------  IN: 2104.8 mL / OUT: 90 mL / NET: 2014.8 mL    12-09 @ 07:01  -  12-09 @ 10:30  --------------------------------------------------------  IN: 1206.8 mL / OUT: 30 mL / NET: 1176.8 mL      CAPILLARY BLOOD GLUCOSE      POCT Blood Glucose.: 126 mg/dL (09 Dec 2023 04:58)      PHYSICAL EXAM:  GENERAL: NAD  EYES: EOMI, PERRLA, conjunctiva and sclera clear  ENMT: ET tube in place   CHEST/LUNG: Clear to auscultation bilaterally; No rales, rhonchi, wheezing, or rubs  HEART: Regular rate and rhythm; No murmurs, rubs, or gallops  ABDOMEN: Soft, Nontender, Nondistended; Bowel sounds present  VASCULAR:  2+ Peripheral Pulses, No clubbing, cyanosis, or edema  LYMPH: No lymphadenopathy noted  SKIN: No rashes or lesions  NERVOUS SYSTEM:  Sedated     LINES:    HOSPITAL MEDICATIONS:  aspirin  chewable 81 milliGRAM(s) Chew daily  heparin   Injectable 5000 Unit(s) SubCutaneous every 8 hours  azithromycin  IVPB 500 milliGRAM(s) IV Intermittent every 24 hours  cefTRIAXone   IVPB 1000 milliGRAM(s) IV Intermittent every 24 hours  DOBUTamine Infusion 2.5 MICROgram(s)/kG/Min IV Continuous <Continuous>  insulin lispro (ADMELOG) corrective regimen sliding scale   SubCutaneous every 6 hours  albuterol/ipratropium for Nebulization 3 milliLiter(s) Nebulizer every 6 hours  midazolam Injectable 4 milliGRAM(s) IV Push every 2 hours PRN  propofol Infusion 10 MICROgram(s)/kG/Min IV Continuous <Continuous>  influenza  Vaccine (HIGH DOSE) 0.7 milliLiter(s) IntraMuscular once  chlorhexidine 0.12% Liquid 15 milliLiter(s) Oral Mucosa every 12 hours  chlorhexidine 2% Cloths 1 Application(s) Topical <User Schedule>        LABS:                        9.5    11.57 )-----------( 143      ( 09 Dec 2023 04:10 )             29.0     Hgb Trend: 9.5<--, 9.7<--, 10.8<--, 13.0<--  12-09    132<L>  |  101  |  58<H>  ----------------------------<  133<H>  4.0   |  20<L>  |  3.32<H>    Ca    8.0<L>      09 Dec 2023 04:10  Phos  6.2     12-09  Mg     2.7     12-09    TPro  6.2  /  Alb  2.1<L>  /  TBili  0.5  /  DBili  x   /  AST  884<H>  /  ALT  784<H>  /  AlkPhos  84  12-09    Creatinine Trend: 3.32<--, 2.97<--, 2.51<--, 2.14<--, 2.15<--  PT/INR - ( 08 Dec 2023 11:50 )   PT: 12.5 sec;   INR: 1.05 ratio         PTT - ( 08 Dec 2023 11:50 )  PTT:29.9 sec  Urinalysis Basic - ( 09 Dec 2023 04:10 )    Color: x / Appearance: x / SG: x / pH: x  Gluc: 133 mg/dL / Ketone: x  / Bili: x / Urobili: x   Blood: x / Protein: x / Nitrite: x   Leuk Esterase: x / RBC: x / WBC x   Sq Epi: x / Non Sq Epi: x / Bacteria: x      Arterial Blood Gas:  12-09 @ 09:13  7.28/43/106/20/97.4/-6.2  ABG lactate: --  Arterial Blood Gas:  12-08 @ 21:25  7.33/38/98/20/97.9/-5.5  ABG lactate: --  Arterial Blood Gas:  12-08 @ 14:57  7.28/43/248/20/98.7/-6.2  ABG lactate: --  Arterial Blood Gas:  12-08 @ 09:25  7.24/47/180/20/98.2/-7.1  ABG lactate: --  Arterial Blood Gas:  12-07 @ 20:53  7.27/44/70/20/92.9/-6.6  ABG lactate: --    Venous Blood Gas:  12-07 @ 14:48  7.27/46/47/21/69.3  VBG Lactate: --      MICROBIOLOGY:     RADIOLOGY:  [ ] Reviewed and interpreted by me    EKG:

## 2023-12-09 NOTE — CHART NOTE - NSCHARTNOTEFT_GEN_A_CORE
:  AMY Draper dr    Indication:  SHOCK    PROCEDURE:  [ x] LIMITED ECHO  [x ] LIMITED CHEST  [ ] LIMITED RETROPERITONEAL  [ ] LIMITED ABDOMINAL  [ ] LIMITED DVT  [ ] NEEDLE GUIDANCE VASCULAR  [ ] NEEDLE GUIDANCE THORACENTESIS  [ ] NEEDLE GUIDANCE PARACENTESIS  [ ] NEEDLE GUIDANCE PERICARDIOCENTESIS  [ ] OTHER    FINDINGS:  Chest: scattered B lines bilat  No effusions bilat.    ECHO: LV> RV with preserved Lv systolic function, no septal bowing/flattening nor obvious wall motion abnl  No pericardial effusion  IVC: + respirophasic variation  LVOT/VTi: 27cm on 5 Dobutamine     INTERPRETATION:  lung exam with scattered b lines   cardiac exam with preserved LV systolic fx     images uploaded to [email or web address] :  AMY Draper dr    Indication:  SHOCK    PROCEDURE:  [ x] LIMITED ECHO  [x ] LIMITED CHEST  [ ] LIMITED RETROPERITONEAL  [ ] LIMITED ABDOMINAL  [ ] LIMITED DVT  [ ] NEEDLE GUIDANCE VASCULAR  [ ] NEEDLE GUIDANCE THORACENTESIS  [ ] NEEDLE GUIDANCE PARACENTESIS  [ ] NEEDLE GUIDANCE PERICARDIOCENTESIS  [ ] OTHER    FINDINGS:  Chest: scattered B lines bilat  No effusions bilat.    ECHO: LV> RV with preserved Lv systolic function, no septal bowing/flattening nor obvious wall motion abnl  No pericardial effusion  IVC: + respirophasic variation  LVOT/VTi: 27cm on 5 Dobutamine     INTERPRETATION:  lung exam with scattered b lines   cardiac exam with preserved LV systolic fx     images uploaded to [employer] :  AMY Draper dr    Indication:  SHOCK    PROCEDURE:  [ x] LIMITED ECHO  [x ] LIMITED CHEST  [ ] LIMITED RETROPERITONEAL  [ ] LIMITED ABDOMINAL  [ ] LIMITED DVT  [ ] NEEDLE GUIDANCE VASCULAR  [ ] NEEDLE GUIDANCE THORACENTESIS  [ ] NEEDLE GUIDANCE PARACENTESIS  [ ] NEEDLE GUIDANCE PERICARDIOCENTESIS  [ ] OTHER    FINDINGS:  Chest: scattered B lines bilat  No effusions bilat.    ECHO: LV> RV with preserved Lv systolic function, no septal bowing/flattening nor obvious wall motion abnl  No pericardial effusion  IVC: + respirophasic variation  LVOT/VTi: 27cm on 5 Dobutamine     INTERPRETATION:  lung exam with scattered b lines   cardiac exam with preserved LV systolic fx     images uploaded to [employer]        Attending Attestation:  I was present during the key portions of the procedure and immediately available during the entire procedure.  Tray Harrison MD  Attending  Pulmonary & Critical Care Medicine

## 2023-12-09 NOTE — PROGRESS NOTE ADULT - ASSESSMENT
87 year old female PMH HTN, HLD, and cardiomyopathy BIBEMS accompanied by granddaughter c/o SOB x 4 days a/w cough w/ white phlegm. Pt admitted for RSV w/ superimposed CAP. RRT called this AM after being found unresponsive on BIPAP then code blue called as pt was pulseless. Pt cardiac arrest w/ an approx 16 min downtime with ROSC. Pt admitted to ICU for further management.    Neuro:  Unresponsive likely in the setting of post cardiac arrest   - Baseline pt is A&O x4 as per chart review  - + Brainstem reflexes, not following commands or purposeful movements  - STAT EEG showing myoclonic seizures, associated with poor prognosis in the setting of anoxic brain injury   - Plan for CT head tomorrow   - Sedation with propofol   - Will continue to wean off sedation and evaluate mentation    Resp:  RSV PNA s/p cardiac arrest with intubation now on ventilator   - Acute hypercapnic/hypoxic respiratory failure likely in the setting of mucus plugging vs superimposed bacterial pna   - Pt w/ high secretion burden post intubation. Pulmonary toileting modalities; on hypertonic saline nebulization & duoneb   - CTA with no signs of PE, bilateral lower lobe infiltrates.  - f/u serial abgs and chest x-ray    CV:  Cardia arrest due to hypoxia   - Will continue to titrate down pressor as tolerated while maintaining MAP goal >65  - POCUS with evidence decreased LV dysfunction from previous echo w/ hx of mod/severe AS per chart review will get official TTE to evaluate  - POCUS today on dobutamine shows marked improvement in cardia function   - Continue to wean down dobutamine     GI:  Transaminitis likely in the setting of cardiac arrest  - will continue to trend LFTs  - Diet: TF  - GI ppx: PPI  - OGT     Renal:  GAUTAM  - Likely prerenal   - whalen- unable to place by primary team placed by Urology   - cont to monitor strict I/Os, trend BUN/Cr  - replete lytes as appropriate     ID:  RSV PNA  - Empirically covered with ceftriaxone and azithromycin   - afebrile, w/ leukocytosis will continue to trend labs  - Lactate 4.8  - F/u Blood cxs/legionella/UA/strep pneumoniae ag/sputum cx  - MRSA negative     Heme:  DVT ppx: HSQ  - cbc stable, continue to monitor     Endo:  sliding scale   - maintaining goal glucose<180 with ISS  - cont to monitor FS    Dispo:  - Guarded/ Full Code. Family at bedside updated on severity of status, will continue to update per course. 87 year old female PMH HTN, HLD, and cardiomyopathy BIBEMS accompanied by granddaughter c/o SOB x 4 days a/w cough w/ white phlegm. Pt admitted for RSV w/ superimposed CAP. RRT called this AM after being found unresponsive on BIPAP then code blue called as pt was pulseless. Pt cardiac arrest w/ an approx 16 min downtime with ROSC. Pt admitted to ICU for further management.    Neuro:  Unresponsive likely in the setting of post cardiac arrest   - Baseline pt is A&O x4 as per chart review  - + Brainstem reflexes, not following commands or purposeful movements  - STAT EEG showing myoclonic seizures, associated with poor prognosis in the setting of anoxic brain injury   - Plan for CT head tomorrow   - Sedation with propofol   - Start Keppra for myoclonus seizures   - Will continue to wean off sedation and evaluate mentation    Resp:  RSV PNA s/p cardiac arrest with intubation now on ventilator   - Acute hypercapnic/hypoxic respiratory failure likely in the setting of mucus plugging vs superimposed bacterial pna   - Pt w/ high secretion burden post intubation. Pulmonary toileting modalities; on hypertonic saline nebulization & duoneb   - CTA with no signs of PE, bilateral lower lobe infiltrates.  - f/u serial abgs and chest x-ray    CV:  Cardia arrest due to hypoxia   - Will continue to titrate down pressor as tolerated while maintaining MAP goal >65  - POCUS with evidence decreased LV dysfunction from previous echo w/ hx of mod/severe AS per chart review will get official TTE to evaluate  - POCUS today on dobutamine shows marked improvement in cardia function   - Continue to wean down dobutamine     GI:  Transaminitis likely in the setting of cardiac arrest  - will continue to trend LFTs  - Diet: TF  - GI ppx: PPI  - OGT     Renal:  GAUTAM  - Likely prerenal   - whalen- unable to place by primary team placed by Urology   - cont to monitor strict I/Os, trend BUN/Cr  - replete lytes as appropriate     ID:  RSV PNA  - Empirically covered with ceftriaxone and azithromycin   - afebrile, w/ leukocytosis will continue to trend labs  - Lactate 4.8  - F/u Blood cxs/legionella/UA/strep pneumoniae ag/sputum cx  - MRSA negative     Heme:  DVT ppx: HSQ  - cbc stable, continue to monitor     Endo:  sliding scale   - maintaining goal glucose<180 with ISS  - cont to monitor FS    Dispo:  - Guarded/ Full Code. Family at bedside updated on severity of status, will continue to update per course.

## 2023-12-09 NOTE — PROGRESS NOTE ADULT - SUBJECTIVE AND OBJECTIVE BOX
Patient is a 87y old  Female who presents with a chief complaint of SOB from CAP (09 Dec 2023 20:15)    HPI:  86 yo f pmhx HTN, moderate to severe AS admitted with hypoxic respiratory failure in setting of RSV requiring NIPPV with course c/b cardiac arrest (ROSC achieved ~16mins).    At bedside patient mental status remains poor. Currently on Propofol gtt. Anoxic jerking movements periodically     Allergies: oxycodone    PAST MEDICAL & SURGICAL HISTORY:  Hypertension      H/O cardiomyopathy      No significant past surgical history        FAMILY HISTORY:  No pertinent family history in first degree relatives      SOCIAL HISTORY:    Home Medications:    Review of Systems:  Pertinent positives noted above, all other ROS negative x10 system    T(F): 98.8 (12-09-23 @ 20:00), Max: 98.8 (12-09-23 @ 20:00)  HR: 69 (12-09-23 @ 20:00) (54 - 74)  BP: --  RR: 30 (12-09-23 @ 20:00) (16 - 32)  SpO2: 95% (12-09-23 @ 20:00)  Wt(kg): --  Mode: AC/ CMV (Assist Control/ Continuous Mandatory Ventilation), RR (machine): 30, TV (machine): 480, FiO2: 50, PEEP: 5  CAPILLARY BLOOD GLUCOSE      POCT Blood Glucose.: 138 mg/dL (09 Dec 2023 17:05)    I&O's Summary    08 Dec 2023 07:01  -  09 Dec 2023 07:00  --------------------------------------------------------  IN: 2104.8 mL / OUT: 90 mL / NET: 2014.8 mL    09 Dec 2023 07:01  -  09 Dec 2023 20:24  --------------------------------------------------------  IN: 1731.9 mL / OUT: 100 mL / NET: 1631.9 mL        Physical Exam:     Gen: critically ill appearing, intubated  Neuro: Sedated w/ Propofol, periodic anoxic jerking  CVS: +S1S2  Resp: CTA. +ET tube  Abd: soft NT ND  Ext: warm dry no edema  Skin: well perfused    Meds:  azithromycin  IVPB 500 milliGRAM(s) IV Intermittent every 24 hours  cefTRIAXone   IVPB 1000 milliGRAM(s) IV Intermittent every 24 hours    norepinephrine Infusion 0.05 MICROgram(s)/kG/Min IV Continuous <Continuous>     insulin lispro (ADMELOG) corrective regimen sliding scale   SubCutaneous every 6 hours     albuterol/ipratropium for Nebulization 3 milliLiter(s) Nebulizer every 6 hours     levETIRAcetam  IVPB 500 milliGRAM(s) IV Intermittent every 12 hours  midazolam Injectable 4 milliGRAM(s) IV Push every 2 hours PRN  propofol Infusion 10 MICROgram(s)/kG/Min IV Continuous <Continuous>        aspirin  chewable 81 milliGRAM(s) Chew daily  heparin   Injectable 5000 Unit(s) SubCutaneous every 8 hours              influenza  Vaccine (HIGH DOSE) 0.7 milliLiter(s) IntraMuscular once     chlorhexidine 0.12% Liquid 15 milliLiter(s) Oral Mucosa every 12 hours  chlorhexidine 2% Cloths 1 Application(s) Topical <User Schedule>                              9.5    11.57 )-----------( 143      ( 09 Dec 2023 04:10 )             29.0       12-09    131<L>  |  98  |  61<H>  ----------------------------<  151<H>  3.6   |  20<L>  |  3.64<H>    Ca    7.9<L>      09 Dec 2023 14:20  Phos  5.9     12-09  Mg     2.7     12-09    TPro  6.1  /  Alb  1.9<L>  /  TBili  0.6  /  DBili  x   /  AST  519<H>  /  ALT  631<H>  /  AlkPhos  89  12-09    Lactate 1.0           12-09 @ 14:20          PT/INR - ( 08 Dec 2023 11:50 )   PT: 12.5 sec;   INR: 1.05 ratio         PTT - ( 08 Dec 2023 11:50 )  PTT:29.9 sec  Urinalysis Basic - ( 09 Dec 2023 14:20 )    Color: x / Appearance: x / SG: x / pH: x  Gluc: 151 mg/dL / Ketone: x  / Bili: x / Urobili: x   Blood: x / Protein: x / Nitrite: x   Leuk Esterase: x / RBC: x / WBC x   Sq Epi: x / Non Sq Epi: x / Bacteria: x      ET Tube ET Tube   No growth   Rare polymorphonuclear leukocytes per low power field  No Squamous epithelial cells per low power field  No organisms seen per oil power field 12-08 @ 16:00  .Blood Blood-Peripheral   No growth at 24 hours -- 12-07 @ 13:00      Rapid RVP Result: Detected (12-07 @ 12:50)    ABG - ( 09 Dec 2023 14:39 )  pH, Arterial: 7.31  pH, Blood: x     /  pCO2: 38    /  pO2: 94    / HCO3: 19    / Base Excess: -6.6  /  SaO2: 98.2              Radiology:   < from: CT Angio Chest PE Protocol w/ IV Cont (12.07.23 @ 15:03) >    ACC: [id number] EXAM:  CT ANGIO CHEST PULM ART [facility]I   ORDERED BY: PETER LOZA     PROCEDURE DATE:  12/07/2023          INTERPRETATION:  HISTORY: Shortness of breath. Hypoxia.    EXAMINATION: CTA CHEST was performed for evaluation of the pulmonary   arteries. Multiplanar reformatted images and MIPS were acquired.  CONTRAST/COMPLICATIONS:  IV Contrast: Omnipaque 350  90 cc administered   10 cc discarded  Oral Contrast: NONE  Complications: None reported at time of study completion    COMPARISON: None available.    FINDINGS:    PULMONARY ARTERIES: many of the segmental and subsegmental pulmonary   branches are limited in assessment secondary to motion. No pulmonary   embolism detected.    MEDIASTINUM: Normal heart size. No pericardial effusion. Thoracic aorta   normal caliber.  No large mediastinal lymph nodes.    AIRWAYS, LUNGS, PLEURA: Patchy and ill-defined consolidative opacities   involving the bilateral upper and lower lobes. No pleural effusion.    IMAGED ABDOMEN: Unremarkable.    SOFT TISSUES and NECK: Enlarged left thyroid lobe with nodule.    BONES: Unremarkable.      IMPRESSION:.    No pulmonary embolism detected. Please note that many of the segmental   and subsegmental pulmonary arterial branches are not adequately assessed   due to motion.    Findings compatible with multifocal pneumonia. Recommend CT chest   follow-up in 1-3 months after treatment to ensure clearing.    --- End of Report ---            HOLLY MOORE MD; Attending Radiologist  This document has beenelectronically signed. Dec  7 2023  3:21PM    < end of copied text >      Problems  Anoxic injury  Cardiac arrest  Hypoxic respiratory failure  Pneumonia  GAUTAM  Oliguria    Assessment/Plan:    Neuro: Anoxic injury secondary to prolonged arrest. Propofol gtt for ventilator complians. EEG + myoclonus. Keppra for seizure prophylaxis. Plan for CTH in AM  CV: Levophed gtt for BP support. Actively titrating for MAP >65  Resp: Full ventilator support. FiO2 titrated down to 40%. Actively titrating for O2 sat >90%. CT chest 12/7 with multifocal PNA  GI: Tube feeds as tolerated w/ Nepro.   Renal: Progressive renal failure; becoming more Oliguric. Sanchez for strict I's and O's  ID: RSV+; pan cultured. Empiric abx coverage w/ Ceftriaxone and Azithromycin  Heme: Heparin SC for DVT prophylaxis  Endo: Glycemic monitoring    Time spent on this patient encounter, which includes documenting this note in the electronic medical record, was 42 minutes including assessing the presenting problems with associated risks, reviewing the medical record to prepare for the encounter, and meeting face to face with the patient to obtain additional history.  I have also performed an appropriate physical exam, made interventions listed and ordered and interpreted appropriate diagnostic studies as documented.     To improve communication and patient safety, I have coordinated care with the multidisciplinary team including the bedside nurse, appropriate attending of record and consultants as needed.      Date of entry of this note is equal to the date of services rendered   Patient is a 87y old  Female who presents with a chief complaint of SOB from CAP (09 Dec 2023 20:15)    HPI:  88 yo f pmhx HTN, moderate to severe AS admitted with hypoxic respiratory failure in setting of RSV requiring NIPPV with course c/b cardiac arrest (ROSC achieved ~16mins).    At bedside patient mental status remains poor. Currently on Propofol gtt. Anoxic jerking movements periodically     Allergies: oxycodone    PAST MEDICAL & SURGICAL HISTORY:  Hypertension      H/O cardiomyopathy      No significant past surgical history        FAMILY HISTORY:  No pertinent family history in first degree relatives      SOCIAL HISTORY:    Home Medications:    Review of Systems:  Pertinent positives noted above, all other ROS negative x10 system    T(F): 98.8 (12-09-23 @ 20:00), Max: 98.8 (12-09-23 @ 20:00)  HR: 69 (12-09-23 @ 20:00) (54 - 74)  BP: --  RR: 30 (12-09-23 @ 20:00) (16 - 32)  SpO2: 95% (12-09-23 @ 20:00)  Wt(kg): --  Mode: AC/ CMV (Assist Control/ Continuous Mandatory Ventilation), RR (machine): 30, TV (machine): 480, FiO2: 50, PEEP: 5  CAPILLARY BLOOD GLUCOSE      POCT Blood Glucose.: 138 mg/dL (09 Dec 2023 17:05)    I&O's Summary    08 Dec 2023 07:01  -  09 Dec 2023 07:00  --------------------------------------------------------  IN: 2104.8 mL / OUT: 90 mL / NET: 2014.8 mL    09 Dec 2023 07:01  -  09 Dec 2023 20:24  --------------------------------------------------------  IN: 1731.9 mL / OUT: 100 mL / NET: 1631.9 mL        Physical Exam:     Gen: critically ill appearing, intubated  Neuro: Sedated w/ Propofol, periodic anoxic jerking  CVS: +S1S2  Resp: CTA. +ET tube  Abd: soft NT ND  Ext: warm dry no edema  Skin: well perfused    Meds:  azithromycin  IVPB 500 milliGRAM(s) IV Intermittent every 24 hours  cefTRIAXone   IVPB 1000 milliGRAM(s) IV Intermittent every 24 hours    norepinephrine Infusion 0.05 MICROgram(s)/kG/Min IV Continuous <Continuous>     insulin lispro (ADMELOG) corrective regimen sliding scale   SubCutaneous every 6 hours     albuterol/ipratropium for Nebulization 3 milliLiter(s) Nebulizer every 6 hours     levETIRAcetam  IVPB 500 milliGRAM(s) IV Intermittent every 12 hours  midazolam Injectable 4 milliGRAM(s) IV Push every 2 hours PRN  propofol Infusion 10 MICROgram(s)/kG/Min IV Continuous <Continuous>        aspirin  chewable 81 milliGRAM(s) Chew daily  heparin   Injectable 5000 Unit(s) SubCutaneous every 8 hours              influenza  Vaccine (HIGH DOSE) 0.7 milliLiter(s) IntraMuscular once     chlorhexidine 0.12% Liquid 15 milliLiter(s) Oral Mucosa every 12 hours  chlorhexidine 2% Cloths 1 Application(s) Topical <User Schedule>                              9.5    11.57 )-----------( 143      ( 09 Dec 2023 04:10 )             29.0       12-09    131<L>  |  98  |  61<H>  ----------------------------<  151<H>  3.6   |  20<L>  |  3.64<H>    Ca    7.9<L>      09 Dec 2023 14:20  Phos  5.9     12-09  Mg     2.7     12-09    TPro  6.1  /  Alb  1.9<L>  /  TBili  0.6  /  DBili  x   /  AST  519<H>  /  ALT  631<H>  /  AlkPhos  89  12-09    Lactate 1.0           12-09 @ 14:20          PT/INR - ( 08 Dec 2023 11:50 )   PT: 12.5 sec;   INR: 1.05 ratio         PTT - ( 08 Dec 2023 11:50 )  PTT:29.9 sec  Urinalysis Basic - ( 09 Dec 2023 14:20 )    Color: x / Appearance: x / SG: x / pH: x  Gluc: 151 mg/dL / Ketone: x  / Bili: x / Urobili: x   Blood: x / Protein: x / Nitrite: x   Leuk Esterase: x / RBC: x / WBC x   Sq Epi: x / Non Sq Epi: x / Bacteria: x      ET Tube ET Tube   No growth   Rare polymorphonuclear leukocytes per low power field  No Squamous epithelial cells per low power field  No organisms seen per oil power field 12-08 @ 16:00  .Blood Blood-Peripheral   No growth at 24 hours -- 12-07 @ 13:00      Rapid RVP Result: Detected (12-07 @ 12:50)    ABG - ( 09 Dec 2023 14:39 )  pH, Arterial: 7.31  pH, Blood: x     /  pCO2: 38    /  pO2: 94    / HCO3: 19    / Base Excess: -6.6  /  SaO2: 98.2              Radiology:   < from: CT Angio Chest PE Protocol w/ IV Cont (12.07.23 @ 15:03) >    ACC: [id number] EXAM:  CT ANGIO CHEST PULM ART [facility]   ORDERED BY: PETER LOZA     PROCEDURE DATE:  12/07/2023          INTERPRETATION:  HISTORY: Shortness of breath. Hypoxia.    EXAMINATION: CTA CHEST was performed for evaluation of the pulmonary   arteries. Multiplanar reformatted images and MIPS were acquired.  CONTRAST/COMPLICATIONS:  IV Contrast: Omnipaque 350  90 cc administered   10 cc discarded  Oral Contrast: NONE  Complications: None reported at time of study completion    COMPARISON: None available.    FINDINGS:    PULMONARY ARTERIES: many of the segmental and subsegmental pulmonary   branches are limited in assessment secondary to motion. No pulmonary   embolism detected.    MEDIASTINUM: Normal heart size. No pericardial effusion. Thoracic aorta   normal caliber.  No large mediastinal lymph nodes.    AIRWAYS, LUNGS, PLEURA: Patchy and ill-defined consolidative opacities   involving the bilateral upper and lower lobes. No pleural effusion.    IMAGED ABDOMEN: Unremarkable.    SOFT TISSUES and NECK: Enlarged left thyroid lobe with nodule.    BONES: Unremarkable.      IMPRESSION:.    No pulmonary embolism detected. Please note that many of the segmental   and subsegmental pulmonary arterial branches are not adequately assessed   due to motion.    Findings compatible with multifocal pneumonia. Recommend CT chest   follow-up in 1-3 months after treatment to ensure clearing.    --- End of Report ---            HOLLY MOORE MD; Attending Radiologist  This document has beenelectronically signed. Dec  7 2023  3:21PM    < end of copied text >      Problems  Anoxic injury  Cardiac arrest  Hypoxic respiratory failure  Pneumonia  GAUTAM  Oliguria    Assessment/Plan:    Neuro: Anoxic injury secondary to prolonged arrest. Propofol gtt for ventilator complians. EEG + myoclonus. Keppra for seizure prophylaxis. Plan for CTH in AM  CV: Levophed gtt for BP support. Actively titrating for MAP >65  Resp: Full ventilator support. FiO2 titrated down to 40%. Actively titrating for O2 sat >90%. CT chest 12/7 with multifocal PNA  GI: Tube feeds as tolerated w/ Nepro.   Renal: Progressive renal failure; becoming more Oliguric. Sanchez for strict I's and O's  ID: RSV+; pan cultured. Empiric abx coverage w/ Ceftriaxone and Azithromycin  Heme: Heparin SC for DVT prophylaxis  Endo: Glycemic monitoring    Time spent on this patient encounter, which includes documenting this note in the electronic medical record, was 42 minutes including assessing the presenting problems with associated risks, reviewing the medical record to prepare for the encounter, and meeting face to face with the patient to obtain additional history.  I have also performed an appropriate physical exam, made interventions listed and ordered and interpreted appropriate diagnostic studies as documented.     To improve communication and patient safety, I have coordinated care with the multidisciplinary team including the bedside nurse, appropriate attending of record and consultants as needed.      Date of entry of this note is equal to the date of services rendered

## 2023-12-09 NOTE — PROGRESS NOTE ADULT - SUBJECTIVE AND OBJECTIVE BOX
INTERVAL HPI:  87 year old female with HTN, Cardiomyopathy ( per record ).   Brought to ED  accompanied by granddaughter with  SOB x 4 days along with cough and  white phlegm.   Per granddaughter, other household members with URI symptoms.   EMS found  pt with audible wheezing, SPO2 in  80s on RA which improved to 90s s/p breathing treatment x 2.   Pt denied CP, LE swelling, no history of Asthma, COPD or CHF.  RVP +ve for RSV, CTA chest: no PE but multifocal pneumonia.  +ve leukocytosis with left shift.  Initially started on BiPAP support.  12/08/23:  Found unconscious and pulseless while on BiPAP. RRT to Code blue, was revived, intubated and transferred to CCU.  Large amount of secretions suctioned from airway during intubation.  At time of my evaluation, on vent, sedated with propofol, requiring Levophed support. SPO2 100% on 100% FiO2 and +5 peep.    OVERNIGHT EVENTS:  In ICU, on ventilator and sedated with propofol. Off Levophed but started on dobutamine for LV dysfunction, good improvement reported with dobutamine.  Afebrile, SPO2 99% on 50% FiO2 and +5 peep.  With jerky movements and seizure, on levETIRAcetam    Vital Signs Last 24 Hrs  T(C): 35.8 (09 Dec 2023 12:00), Max: 37 (08 Dec 2023 12:30)  T(F): 96.4 (09 Dec 2023 12:00), Max: 98.6 (08 Dec 2023 12:30)  HR: 69 (09 Dec 2023 12:00) (54 - 74)  BP: 111/50 (08 Dec 2023 16:00) (111/50 - 122/47)  BP(mean): 69 (08 Dec 2023 16:00) (66 - 70)  RR: 16 (09 Dec 2023 12:00) (13 - 32)  SpO2: 99% (09 Dec 2023 12:00) (96% - 100%)    Parameters below as of 09 Dec 2023 07:00  Patient On (Oxygen Delivery Method): ventilator    Mode: AC/ CMV (Assist Control/ Continuous Mandatory Ventilation)  RR (machine): 30  TV (machine): 480  FiO2: 50  PEEP: 5  ITime: 1  MAP: 15  PIP: 31    PHYSICAL EXAM:  GEN:        On Vent, sedated, comfortable.  HEENT:    ETT    RESP:      No distress  CVS:         Regular rate and rhythm.     MEDICATIONS  (STANDING):  albuterol/ipratropium for Nebulization 3 milliLiter(s) Nebulizer every 6 hours  aspirin  chewable 81 milliGRAM(s) Chew daily  azithromycin  IVPB 500 milliGRAM(s) IV Intermittent every 24 hours  cefTRIAXone   IVPB 1000 milliGRAM(s) IV Intermittent every 24 hours  chlorhexidine 0.12% Liquid 15 milliLiter(s) Oral Mucosa every 12 hours  chlorhexidine 2% Cloths 1 Application(s) Topical <User Schedule>  DOBUTamine Infusion 2.5 MICROgram(s)/kG/Min (7.63 mL/Hr) IV Continuous <Continuous>  heparin   Injectable 5000 Unit(s) SubCutaneous every 8 hours  influenza  Vaccine (HIGH DOSE) 0.7 milliLiter(s) IntraMuscular once  insulin lispro (ADMELOG) corrective regimen sliding scale   SubCutaneous every 6 hours  levETIRAcetam  IVPB 500 milliGRAM(s) IV Intermittent every 12 hours  propofol Infusion 10 MICROgram(s)/kG/Min (6.02 mL/Hr) IV Continuous <Continuous>    MEDICATIONS  (PRN):  midazolam Injectable 4 milliGRAM(s) IV Push every 2 hours PRN Vent Synchrony/Myoclonus    LABS:                        9.5    11.57 )-----------( 143      ( 09 Dec 2023 04:10 )             29.0     12-09    132<L>  |  101  |  58<H>  ----------------------------<  133<H>  4.0   |  20<L>  |  3.32<H>    Ca    8.0<L>      09 Dec 2023 04:10  Phos  6.2     12-09  Mg     2.7     12-09    TPro  6.2  /  Alb  2.1<L>  /  TBili  0.5  /  DBili  x   /  AST  884<H>  /  ALT  784<H>  /  AlkPhos  84  12-09    PT/INR - ( 08 Dec 2023 11:50 )   PT: 12.5 sec;   INR: 1.05 ratio      PTT - ( 08 Dec 2023 11:50 )  PTT:29.9 sec  12-09 @ 09:13  pH: --  pCO2: 43  pO2: 106  SaO2: 97.4  12-08 @ 21:25  pH: --  pCO2: 38  pO2: 98  SaO2: 97.9  12-08 @ 14:57  pH: --  pCO2: 43  pO2: 248  SaO2: 98.7  12-08 @ 09:25  pH: --  pCO2: 47  pO2: 180  SaO2: 98.2  12-07 @ 20:53  pH: --  pCO2: 44  pO2: 70  SaO2: 92.9    Urinalysis Basic - ( 09 Dec 2023 04:10 )    Color: x / Appearance: x / SG: x / pH: x  Gluc: 133 mg/dL / Ketone: x  / Bili: x / Urobili: x   Blood: x / Protein: x / Nitrite: x   Leuk Esterase: x / RBC: x / WBC x   Sq Epi: x / Non Sq Epi: x / Bacteria: x    ASSESSMENT AND PLAN:  Acute hypoxic Respiratory failure  S/P Cardiac arrest.  Hypotension.  Multifocal pneumonia.  Leukocytosis with left shift.  Positive RSV.  Renal insuffiencey  Anemia.  HTN hx.  Seizure,    Continue full vent support, titrate down FiO2 as tolerated.   Follow ABG and post intubation chest XRAY  On hypertonic saline nebulization.  Titrate down Levophed as tolerated.  Continue antibiotic and bronchodilator.  Follow neuro status.  DVT and GI prophylaxis.    12/09/23: In ICU, on ventilator and sedated with propofol. Off Levophed but started on dobutamine for LV dysfunction, good improvement reported with dobutamine.  Afebrile, SPO2 99% on 50% FiO2 and +5 peep.  With jerky movements and seizure, on levETIRAcetam  Empiric antibiotics and bronchodilator.

## 2023-12-10 NOTE — PROGRESS NOTE ADULT - ASSESSMENT
87 year old female PMH HTN, HLD, and cardiomyopathy BIBEMS accompanied by granddaughter c/o SOB x 4 days a/w cough w/ white phlegm. Pt admitted for RSV w/ superimposed CAP. RRT called this AM after being found unresponsive on BIPAP then code blue called as pt was pulseless. Pt cardiac arrest w/ an approx 16 min downtime with ROSC. Pt admitted to ICU for further management. EEG and CT head consistent with anoxic injury.     Neuro:  Unresponsive likely in the setting of post cardiac arrest   - Baseline pt is A&O x4 as per chart review  - + Brainstem reflexes, not following commands or purposeful movements  - STAT EEG showing myoclonic seizures, associated with poor prognosis in the setting of anoxic brain injury   - CT head shows global hypoxic injury and right occipital infarct    - Sedation with propofol   - c/w Keppra for myoclonus seizures   - Will continue to wean off sedation and evaluate mentation    Resp:  RSV PNA s/p cardiac arrest with intubation now on ventilator   - Acute hypercapnic/hypoxic respiratory failure likely in the setting of mucus plugging vs superimposed bacterial pna   - Pt w/ high secretion burden post intubation. Pulmonary toileting modalities; on hypertonic saline nebulization & duoneb   - CTA with no signs of PE, bilateral lower lobe infiltrates.  - f/u serial abgs and chest x-ray    CV:  Cardiac arrest due to hypoxia   - Will continue to titrate down pressor as tolerated while maintaining MAP goal >65  - POCUS with evidence decreased LV dysfunction from previous echo w/ hx of mod/severe AS per chart review will get official TTE to evaluate  - POCUS today on dobutamine shows marked improvement in cardia function   - Continue to wean down dobutamine     GI:  Transaminitis likely in the setting of cardiac arrest  - will continue to trend LFTs  - Diet: TF  - GI ppx: PPI  - OGT     Renal:  GAUTAM  - Likely prerenal   - whalen- unable to place by primary team placed by Urology   - cont to monitor strict I/Os, trend BUN/Cr  - replete lytes as appropriate     ID:  RSV PNA  - Empirically covered with ceftriaxone and azithromycin   - afebrile, w/ leukocytosis will continue to trend labs  - Lactate 4.8  - F/u Blood cxs/legionella/UA/strep pneumoniae ag/sputum cx  - MRSA negative     Heme:  DVT ppx: HSQ  - cbc stable, continue to monitor     Endo:  sliding scale   - maintaining goal glucose<180 with ISS  - cont to monitor FS    Dispo:  - Guarded/ Full Code. Family at bedside updated on severity of status, will continue to update per course. 87 year old female PMH HTN, HLD, and cardiomyopathy BIBEMS accompanied by granddaughter c/o SOB x 4 days a/w cough w/ white phlegm. Pt admitted for RSV w/ superimposed CAP. RRT called this AM after being found unresponsive on BIPAP then code blue called as pt was pulseless. Pt cardiac arrest w/ an approx 16 min downtime with ROSC. Pt admitted to ICU for further management. EEG and CT head consistent with anoxic injury.     Neuro:  Unresponsive likely in the setting of post cardiac arrest   - Baseline pt is A&O x4 as per chart review  - + Brainstem reflexes, not following commands or purposeful movements  - STAT EEG showing myoclonic seizures, associated with poor prognosis in the setting of anoxic brain injury   - CT head shows global hypoxic injury and right occipital infarct    - Sedation with propofol   - c/w Keppra for myoclonus seizures   - Will continue to wean off sedation and evaluate mentation    Resp:  RSV PNA s/p cardiac arrest with intubation now on ventilator   - Acute hypercapnic/hypoxic respiratory failure likely in the setting of mucus plugging vs superimposed bacterial pna   - Pt w/ high secretion burden post intubation. Pulmonary toileting modalities; on hypertonic saline nebulization & duoneb   - CTA with no signs of PE, bilateral lower lobe infiltrates.  - f/u serial abgs and chest x-ray    CV:  Cardiac arrest due to hypoxia   - Will continue to titrate down pressor as tolerated while maintaining MAP goal >65  - POCUS with evidence decreased LV dysfunction from previous echo w/ hx of mod/severe AS per chart review will get official TTE to evaluate  - POCUS today on dobutamine shows marked improvement in cardia function   - Continue to wean down dobutamine     GI:  Transaminitis likely in the setting of cardiac arrest  - will continue to trend LFTs  - Diet: TF  - GI ppx: PPI  - OGT     Renal:  GAUTAM  - Likely prerenal   - whalen- unable to place by primary team placed by Urology   - cont to monitor strict I/Os, trend BUN/Cr  - replete lytes as appropriate     ID:  RSV PNA  - D/c abx given cultures are negative   - afebrile, w/ leukocytosis will continue to trend labs  - Blood cxs/legionella/UA/strep pneumoniae ag/sputum cx all negative   - MRSA negative     Heme:  DVT ppx: HSQ  - cbc stable, continue to monitor     Endo:  sliding scale   - maintaining goal glucose<180 with ISS  - cont to monitor FS    Dispo:  - Guarded/ Full Code. Family at bedside updated on severity of status, will continue to update per course.

## 2023-12-10 NOTE — PROGRESS NOTE ADULT - SUBJECTIVE AND OBJECTIVE BOX
CHIEF COMPLAINT: Cardia arrest    Interval Events: CT head shows global hypoxic injury and acute right occipital infarct    REVIEW OF SYSTEMS:    Unable to perform ROS due to lack of mental status       OBJECTIVE:  ICU Vital Signs Last 24 Hrs  T(C): 37 (10 Dec 2023 10:30), Max: 37.7 (10 Dec 2023 02:15)  T(F): 98.6 (10 Dec 2023 10:30), Max: 99.9 (10 Dec 2023 02:15)  HR: 68 (10 Dec 2023 10:30) (63 - 82)  BP: 86/49 (10 Dec 2023 09:00) (86/49 - 115/48)  BP(mean): 62 (10 Dec 2023 09:00) (62 - 67)  ABP: 128/52 (10 Dec 2023 10:30) (96/39 - 164/86)  ABP(mean): 79 (10 Dec 2023 10:30) (60 - 114)  RR: 30 (10 Dec 2023 10:30) (16 - 33)  SpO2: 96% (10 Dec 2023 10:30) (93% - 100%)    O2 Parameters below as of 10 Dec 2023 07:00  Patient On (Oxygen Delivery Method): ventilator          Mode: AC/ CMV (Assist Control/ Continuous Mandatory Ventilation), RR (machine): 30, TV (machine): 480, FiO2: 40, PEEP: 5, ITime: 1, MAP: 13, PIP: 32    12-09 @ 07:01  -  12-10 @ 07:00  --------------------------------------------------------  IN: 2586.9 mL / OUT: 250 mL / NET: 2336.9 mL    12-10 @ 07:01  -  12-10 @ 11:06  --------------------------------------------------------  IN: 82.5 mL / OUT: 25 mL / NET: 57.5 mL      CAPILLARY BLOOD GLUCOSE      POCT Blood Glucose.: 120 mg/dL (10 Dec 2023 05:32)      PHYSICAL EXAM:  GENERAL: NAD, well-groomed, well-developed  EYES: EOMI, PERRLA, conjunctiva and sclera clear  ENMT: ET tube in place   CHEST/LUNG: Clear to auscultation bilaterally; No rales, rhonchi, wheezing, or rubs  HEART: Regular rate and rhythm; No murmurs, rubs, or gallops  ABDOMEN: Soft, Nontender, Nondistended; Bowel sounds present  VASCULAR:  2+ Peripheral Pulses, No clubbing, cyanosis, or edema  LYMPH: No lymphadenopathy noted  SKIN: No rashes or lesions  NERVOUS SYSTEM:  No mental status   LINES:    HOSPITAL MEDICATIONS:  aspirin  chewable 81 milliGRAM(s) Chew daily  heparin   Injectable 5000 Unit(s) SubCutaneous every 8 hours  cefTRIAXone   IVPB 1000 milliGRAM(s) IV Intermittent every 24 hours  norepinephrine Infusion 0.05 MICROgram(s)/kG/Min IV Continuous <Continuous>  insulin lispro (ADMELOG) corrective regimen sliding scale   SubCutaneous every 6 hours  albuterol/ipratropium for Nebulization 3 milliLiter(s) Nebulizer every 6 hours  levETIRAcetam  IVPB 500 milliGRAM(s) IV Intermittent every 12 hours  midazolam Injectable 4 milliGRAM(s) IV Push every 2 hours PRN  propofol Infusion 10 MICROgram(s)/kG/Min IV Continuous <Continuous>  influenza  Vaccine (HIGH DOSE) 0.7 milliLiter(s) IntraMuscular once  chlorhexidine 0.12% Liquid 15 milliLiter(s) Oral Mucosa every 12 hours  chlorhexidine 2% Cloths 1 Application(s) Topical <User Schedule>        LABS:                        9.4    12.93 )-----------( 158      ( 10 Dec 2023 02:53 )             27.6     Hgb Trend: 9.4<--, 9.5<--, 9.7<--, 10.8<--, 13.0<--  12-10    132<L>  |  100  |  65<H>  ----------------------------<  135<H>  3.8   |  19<L>  |  3.87<H>    Ca    8.0<L>      10 Dec 2023 02:53  Phos  5.8     12-10  Mg     2.6     12-10    TPro  6.1  /  Alb  1.9<L>  /  TBili  0.4  /  DBili  x   /  AST  318<H>  /  ALT  518<H>  /  AlkPhos  97  12-10    Creatinine Trend: 3.87<--, 3.64<--, 3.32<--, 2.97<--, 2.51<--, 2.14<--  PT/INR - ( 08 Dec 2023 11:50 )   PT: 12.5 sec;   INR: 1.05 ratio         PTT - ( 08 Dec 2023 11:50 )  PTT:29.9 sec  Urinalysis Basic - ( 10 Dec 2023 02:53 )    Color: x / Appearance: x / SG: x / pH: x  Gluc: 135 mg/dL / Ketone: x  / Bili: x / Urobili: x   Blood: x / Protein: x / Nitrite: x   Leuk Esterase: x / RBC: x / WBC x   Sq Epi: x / Non Sq Epi: x / Bacteria: x      Arterial Blood Gas:  12-09 @ 14:39  7.31/38/94/19/98.2/-6.6  ABG lactate: --  Arterial Blood Gas:  12-09 @ 09:13  7.28/43/106/20/97.4/-6.2  ABG lactate: --  Arterial Blood Gas:  12-08 @ 21:25  7.33/38/98/20/97.9/-5.5  ABG lactate: --  Arterial Blood Gas:  12-08 @ 14:57  7.28/43/248/20/98.7/-6.2  ABG lactate: --        MICROBIOLOGY:     RADIOLOGY:  [x ] Reviewed and interpreted by me    < from: CT Head No Cont (12.10.23 @ 09:19) >  IMPRESSION:  Acute right occipital lobe infarction. Diffuse sulcal   effacement as compared to the prior study suggesting global hypoxic   ischemic injury. Consider MRI of the brain as clinically warranted.    < end of copied text >      EKG:

## 2023-12-10 NOTE — PROGRESS NOTE ADULT - SUBJECTIVE AND OBJECTIVE BOX
INTERVAL HPI:  87 year old female with HTN, Cardiomyopathy ( per record ).   Brought to ED  accompanied by granddaughter with  SOB x 4 days along with cough and  white phlegm.   Per granddaughter, other household members with URI symptoms.   EMS found  pt with audible wheezing, SPO2 in  80s on RA which improved to 90s s/p breathing treatment x 2.   Pt denied CP, LE swelling, no history of Asthma, COPD or CHF.  RVP +ve for RSV, CTA chest: no PE but multifocal pneumonia.  +ve leukocytosis with left shift.  Initially started on BiPAP support.  12/08/23:  Found unconscious and pulseless while on BiPAP. RRT to Code blue, was revived, intubated and transferred to CCU.  Large amount of secretions suctioned from airway during intubation.  At time of my evaluation, on vent, sedated with propofol, requiring Levophed support. SPO2 100% on 100% FiO2 and +5 peep.    OVERNIGHT EVENTS:  Remains in ICU, on Vent and sedated with propofol. Off dobutamine.  Still with jerky  movements on Keppra.  Had head CT earlier today. SPO2 94% on 40% FiO2 with +5 peep.    Vital Signs Last 24 Hrs  T(C): 37.4 (10 Dec 2023 09:10), Max: 37.7 (10 Dec 2023 02:15)  T(F): 99.3 (10 Dec 2023 09:10), Max: 99.9 (10 Dec 2023 02:15)  HR: 74 (10 Dec 2023 09:10) (63 - 82)  BP: 86/49 (10 Dec 2023 09:00) (86/49 - 115/48)  BP(mean): 62 (10 Dec 2023 09:00) (62 - 67)  RR: 16 (10 Dec 2023 09:10) (16 - 33)  SpO2: 95% (10 Dec 2023 09:10) (93% - 100%)    Parameters below as of 10 Dec 2023 07:00  Patient On (Oxygen Delivery Method): ventilator    Mode: AC/ CMV (Assist Control/ Continuous Mandatory Ventilation)  RR (machine): 30  TV (machine): 480  FiO2: 40  PEEP: 5  ITime: 1  MAP: 13  PIP: 32    PHYSICAL EXAM:  GEN:        On vent, comfortable.  HEENT:    ETT   RESP:      no distress  CVS:         Regular rate and rhythm.     MEDICATIONS  (STANDING):  albuterol/ipratropium for Nebulization 3 milliLiter(s) Nebulizer every 6 hours  aspirin  chewable 81 milliGRAM(s) Chew daily  cefTRIAXone   IVPB 1000 milliGRAM(s) IV Intermittent every 24 hours  chlorhexidine 0.12% Liquid 15 milliLiter(s) Oral Mucosa every 12 hours  chlorhexidine 2% Cloths 1 Application(s) Topical <User Schedule>  heparin   Injectable 5000 Unit(s) SubCutaneous every 8 hours  influenza  Vaccine (HIGH DOSE) 0.7 milliLiter(s) IntraMuscular once  insulin lispro (ADMELOG) corrective regimen sliding scale   SubCutaneous every 6 hours  levETIRAcetam  IVPB 500 milliGRAM(s) IV Intermittent every 12 hours  norepinephrine Infusion 0.05 MICROgram(s)/kG/Min (9.53 mL/Hr) IV Continuous <Continuous>  propofol Infusion 10 MICROgram(s)/kG/Min (6.02 mL/Hr) IV Continuous <Continuous>    MEDICATIONS  (PRN):  midazolam Injectable 4 milliGRAM(s) IV Push every 2 hours PRN Vent Synchrony/Myoclonus    LABS:                        9.4    12.93 )-----------( 158      ( 10 Dec 2023 02:53 )             27.6     12-10    132<L>  |  100  |  65<H>  ----------------------------<  135<H>  3.8   |  19<L>  |  3.87<H>    Ca    8.0<L>      10 Dec 2023 02:53  Phos  5.8     12-10  Mg     2.6     12-10    TPro  6.1  /  Alb  1.9<L>  /  TBili  0.4  /  DBili  x   /  AST  318<H>  /  ALT  518<H>  /  AlkPhos  97  12-10    PT/INR - ( 08 Dec 2023 11:50 )   PT: 12.5 sec;   INR: 1.05 ratio      PTT - ( 08 Dec 2023 11:50 )  PTT:29.9 sec  12-09 @ 14:39  pH: --  pCO2: 38  pO2: 94  SaO2: 98.2  12-09 @ 09:13  pH: --  pCO2: 43  pO2: 106  SaO2: 97.4  12-08 @ 21:25  pH: --  pCO2: 38  pO2: 98  SaO2: 97.9  12-08 @ 14:57  pH: --  pCO2: 43  pO2: 248  SaO2: 98.7  12-08 @ 09:25  pH: --  pCO2: 47  pO2: 180  SaO2: 98.2  12-07 @ 20:53  pH: --  pCO2: 44  pO2: 70  SaO2: 92.9    Urinalysis Basic - ( 10 Dec 2023 02:53 )    Color: x / Appearance: x / SG: x / pH: x  Gluc: 135 mg/dL / Ketone: x  / Bili: x / Urobili: x   Blood: x / Protein: x / Nitrite: x   Leuk Esterase: x / RBC: x / WBC x   Sq Epi: x / Non Sq Epi: x / Bacteria: x    ASSESSMENT AND PLAN:  Acute hypoxic Respiratory failure  S/P Cardiac arrest.  Hypotension.  Multifocal pneumonia.  Leukocytosis with left shift.  Positive RSV.  Renal insuffiencey  Anemia.  HTN hx.  Seizure,    Continue full vent support, titrate down FiO2 as tolerated.   Follow ABG and post intubation chest XRAY  On hypertonic saline nebulization.  Titrate down Levophed as tolerated.  Continue antibiotic and bronchodilator.  Follow neuro status.  DVT and GI prophylaxis.    12/09/23: In ICU, on ventilator and sedated with propofol. Off Levophed but started on dobutamine for LV dysfunction, good improvement reported with dobutamine.  Afebrile, SPO2 99% on 50% FiO2 and +5 peep.  With jerky movements and seizure, on levETIRAcetam  Empiric antibiotics and bronchodilator.    12/10/23:   Remains in ICU, on Vent and sedated with propofol. Off dobutamine.  Still with jerky  movements on Keppra.  Had head CT earlier today, follow results.  SPO2 94% on 40% FiO2 with +5 peep.

## 2023-12-11 NOTE — GOALS OF CARE CONVERSATION - ADVANCED CARE PLANNING - CONVERSATION DETAILS
Disucssed with patient's daughters at bedside (Kelli Childs and her sister). They want to palliatively extubate tomorrow after more family including her son comes.

## 2023-12-11 NOTE — DIETITIAN INITIAL EVALUATION ADULT - NSFNSGIIOFT_GEN_A_CORE
12-10-23 @ 07:01  -  12-11-23 @ 07:00  --------------------------------------------------------  OUT:  Total OUT: 0 mL    Total NET: 250 mL      12-11-23 @ 07:01  -  12-11-23 @ 15:29  --------------------------------------------------------  OUT:  Total OUT: 0 mL    Total NET: 120 mL

## 2023-12-11 NOTE — PROGRESS NOTE ADULT - SUBJECTIVE AND OBJECTIVE BOX
INTERVAL HPI:    87 year old female with HTN, Cardiomyopathy ( per record ).   Brought to ED  accompanied by granddaughter with  SOB x 4 days along with cough and  white phlegm.   Per granddaughter, other household members with URI symptoms.   EMS found  pt with audible wheezing, SPO2 in  80s on RA which improved to 90s s/p breathing treatment x 2.   Pt denied CP, LE swelling, no history of Asthma, COPD or CHF.  RVP +ve for RSV, CTA chest: no PE but multifocal pneumonia.  +ve leukocytosis with left shift.  Initially started on BiPAP support.  12/08/23:  Found unconscious and pulseless while on BiPAP. RRT to Code blue, was revived, intubated and transferred to CCU.  Large amount of secretions suctioned from airway during intubation.  At time of my evaluation, on vent, sedated with propofol, requiring Levophed support. SPO2 100% on 100% FiO2 and +5 peep.    OVERNIGHT EVENTS:  On Vent, in ICU, off Levophed and dobutamine.  CT brain with occipital infarct and anoxic injury. No improvement in over all mental status.  Afebrile, SPO2 97% on 40% FiO2.    Vital Signs Last 24 Hrs  T(C): 35.8 (11 Dec 2023 20:00), Max: 38.3 (11 Dec 2023 04:30)  T(F): 96.4 (11 Dec 2023 20:00), Max: 100.9 (11 Dec 2023 04:30)  HR: 69 (11 Dec 2023 20:00) (64 - 94)  BP: --  BP(mean): --  RR: 30 (11 Dec 2023 20:00) (30 - 38)  SpO2: 97% (11 Dec 2023 20:00) (92% - 98%)    Parameters below as of 11 Dec 2023 07:00  Patient On (Oxygen Delivery Method): ventilator    Mode: AC/ CMV (Assist Control/ Continuous Mandatory Ventilation)  RR (machine): 30  TV (machine): 480  FiO2: 40  PEEP: 5  ITime: 1  MAP: 14  PIP: 28    PHYSICAL EXAM:  GEN:        On Vent, comfortable.  HEENT:     ETT  RESP:       no distress  CVS:           Regular rate and rhythm.     MEDICATIONS  (STANDING):  albuterol/ipratropium for Nebulization 3 milliLiter(s) Nebulizer every 6 hours  aspirin  chewable 81 milliGRAM(s) Chew daily  chlorhexidine 0.12% Liquid 15 milliLiter(s) Oral Mucosa every 12 hours  chlorhexidine 2% Cloths 1 Application(s) Topical <User Schedule>  heparin   Injectable 5000 Unit(s) SubCutaneous every 8 hours  influenza  Vaccine (HIGH DOSE) 0.7 milliLiter(s) IntraMuscular once  insulin lispro (ADMELOG) corrective regimen sliding scale   SubCutaneous every 6 hours  levETIRAcetam  IVPB 500 milliGRAM(s) IV Intermittent every 12 hours  propofol Infusion 10 MICROgram(s)/kG/Min (6.02 mL/Hr) IV Continuous <Continuous>    MEDICATIONS  (PRN):  midazolam Injectable 4 milliGRAM(s) IV Push every 2 hours PRN Vent Synchrony/Myoclonus    LABS:                        8.9    13.11 )-----------( 176      ( 11 Dec 2023 04:54 )             25.9     12-11    131<L>  |  101  |  75<H>  ----------------------------<  109<H>  3.8   |  18<L>  |  4.22<H>    Ca    7.8<L>      11 Dec 2023 04:54  Phos  6.1     12-11  Mg     2.8     12-11    TPro  6.2  /  Alb  1.8<L>  /  TBili  0.4  /  DBili  x   /  AST  153<H>  /  ALT  340<H>  /  AlkPhos  100  12-11 12-09 @ 14:39  pH: --  pCO2: 38  pO2: 94  SaO2: 98.2  12-09 @ 09:13  pH: --  pCO2: 43  pO2: 106  SaO2: 97.4  12-08 @ 21:25  pH: --  pCO2: 38  pO2: 98  SaO2: 97.9  12-08 @ 14:57  pH: --  pCO2: 43  pO2: 248  SaO2: 98.7  12-08 @ 09:25  pH: --  pCO2: 47  pO2: 180  SaO2: 98.2  12-07 @ 20:53  pH: --  pCO2: 44  pO2: 70  SaO2: 92.9    Urinalysis Basic - ( 11 Dec 2023 04:54 )    Color: x / Appearance: x / SG: x / pH: x  Gluc: 109 mg/dL / Ketone: x  / Bili: x / Urobili: x   Blood: x / Protein: x / Nitrite: x   Leuk Esterase: x / RBC: x / WBC x   Sq Epi: x / Non Sq Epi: x / Bacteria: x    ASSESSMENT AND PLAN:  Acute hypoxic Respiratory failure  S/P Cardiac arrest.  Hypotension.  Multifocal pneumonia.  Leukocytosis with left shift.  Positive RSV.  Renal insuffiencey  Anemia.  HTN hx.  Seizure,    Continue full vent support, titrate down FiO2 as tolerated.   Follow ABG and post intubation chest XRAY  On hypertonic saline nebulization.  Titrate down Levophed as tolerated.  Continue antibiotic and bronchodilator.  Follow neuro status.  DVT and GI prophylaxis.    12/09/23: In ICU, on ventilator and sedated with propofol. Off Levophed but started on dobutamine for LV dysfunction, good improvement reported with dobutamine.  Afebrile, SPO2 99% on 50% FiO2 and +5 peep.  With jerky movements and seizure, on levETIRAcetam  Empiric antibiotics and bronchodilator.    12/10/23:   Remains in ICU, on Vent and sedated with propofol. Off dobutamine.  Still with jerky  movements on Keppra.  Had head CT earlier today, follow results.  SPO2 94% on 40% FiO2 with +5 peep.    12/11/23: On Vent, in ICU, off Levophed and dobutamine.  CT brain with occipital infarct and anoxic injury. No improvement in over all mental status.  Afebrile, SPO2 97% on 40% FiO2.  Now with DNR status.

## 2023-12-11 NOTE — DIETITIAN INITIAL EVALUATION ADULT - ENTERAL
Nepro @ 30 ml/hr x 18 hours=540 ml, 972 calories, 44 grams protein, 394 ml free water+  + additional calories from propofol as per infused volume

## 2023-12-11 NOTE — DIETITIAN INITIAL EVALUATION ADULT - PERTINENT LABORATORY DATA
12-11    131<L>  |  101  |  75<H>  ----------------------------<  109<H>  3.8   |  18<L>  |  4.22<H>    Ca    7.8<L>      11 Dec 2023 04:54  Phos  6.1     12-11  Mg     2.8     12-11    TPro  6.2  /  Alb  1.8<L>  /  TBili  0.4  /  DBili  x   /  AST  153<H>  /  ALT  340<H>  /  AlkPhos  100  12-11  POCT Blood Glucose.: 139 mg/dL (12-11-23 @ 11:04)  A1C with Estimated Average Glucose Result: 5.8 %<H-pre-diabetic range> (12-08-23)

## 2023-12-11 NOTE — PROGRESS NOTE ADULT - SUBJECTIVE AND OBJECTIVE BOX
HPI:  Pt is an 86 yo F with h/o cardiomyopathy, HTN, and HLD BIBEMS accompanied by granddaughter 2 to SOB and cough with white phlegm. Admitting dx: RSV with superimposed CAP. RRT called pt found to be unresponsive on BIPAP then code blue called as pt was pulseless. Pt cardiac arrest with approx 16 min downtime with ROSC. EEG and CT head c/w anoxic brain injury. ICU dx: 1) Cardiac arrest with resultant anoxic encephalopathy 2) RSV    ## Labs:  CBC:                        8.9    13.11 )-----------( 176      ( 11 Dec 2023 04:54 )             25.9     Chem:  12-11    131<L>  |  101  |  75<H>  ----------------------------<  109<H>  3.8   |  18<L>  |  4.22<H>    Ca    7.8<L>      11 Dec 2023 04:54  Phos  6.1     12-11  Mg     2.8     12-11    TPro  6.2  /  Alb  1.8<L>  /  TBili  0.4  /  DBili  x   /  AST  153<H>  /  ALT  340<H>  /  AlkPhos  100  12-11    Coags:          ## Imaging:    ## Medications:      albuterol/ipratropium for Nebulization 3 milliLiter(s) Nebulizer every 6 hours    insulin lispro (ADMELOG) corrective regimen sliding scale   SubCutaneous every 6 hours    aspirin  chewable 81 milliGRAM(s) Chew daily  heparin   Injectable 5000 Unit(s) SubCutaneous every 8 hours      levETIRAcetam  IVPB 500 milliGRAM(s) IV Intermittent every 12 hours  midazolam Injectable 4 milliGRAM(s) IV Push every 2 hours PRN  propofol Infusion 10 MICROgram(s)/kG/Min IV Continuous <Continuous>      ## Vitals:  T(C): 36.6 (12-11-23 @ 12:00), Max: 38.3 (12-11-23 @ 04:30)  HR: 70 (12-11-23 @ 12:12) (64 - 94)  BP: --  BP(mean): --  RR: 30 (12-11-23 @ 12:00) (29 - 38)  SpO2: 97% (12-11-23 @ 12:12) (94% - 99%)  Wt(kg): --  Vent: Mode: AC/ CMV (Assist Control/ Continuous Mandatory Ventilation), RR (machine): 30, RR (patient): 30, TV (machine): 480, FiO2: 40, PEEP: 5, PIP: 29  ABG: ABG - ( 09 Dec 2023 14:39 )  pH, Arterial: 7.31  pH, Blood: x     /  pCO2: 38    /  pO2: 94    / HCO3: 19    / Base Excess: -6.6  /  SaO2: 98.2                  12-10 @ 07:01  -  12-11 @ 07:00  --------------------------------------------------------  IN: 1113 mL / OUT: 495 mL / NET: 618 mL    12-11 @ 07:01  - 12-11 @ 12:24  --------------------------------------------------------  IN: 272.5 mL / OUT: 125 mL / NET: 147.5 mL          ## P/E:  Gen: lying comfortably in bed in no apparent distress  Mouth: (+) ETT/OGT  Lungs: CTA  Heart: RRR  Abd: Soft/+BS/ Non-tender  Ext: Hand edema  Neuro: No gag reflex, (+) cough, no response to painful stimuli, (+) spont resp    CENTRAL LINE: [ ] YES [ ] NO  LOCATION:   DATE INSERTED:  REMOVE: [ ] YES [ ] NO      COWAN: [ ] YES [ ] NO    DATE INSERTED:  REMOVE:  [ ] YES [ ] NO      A-LINE:  [x ] YES [ ] NO  LOCATION: L radial   DATE INSERTED:  REMOVE:  [ ] YES [ ] NO  EXPLAIN:      CODE STATUS: [ ] full code  [x ] DNR  [ ] DNI  [ ] [facility]  Goals of care discussion: [ ] yes  HPI:  Pt is an 88 yo F with h/o cardiomyopathy, HTN, and HLD BIBEMS accompanied by granddaughter 2 to SOB and cough with white phlegm. Admitting dx: RSV with superimposed CAP. RRT called pt found to be unresponsive on BIPAP then code blue called as pt was pulseless. Pt cardiac arrest with approx 16 min downtime with ROSC. EEG and CT head c/w anoxic brain injury. ICU dx: 1) Cardiac arrest with resultant anoxic encephalopathy 2) RSV    ## Labs:  CBC:                        8.9    13.11 )-----------( 176      ( 11 Dec 2023 04:54 )             25.9     Chem:  12-11    131<L>  |  101  |  75<H>  ----------------------------<  109<H>  3.8   |  18<L>  |  4.22<H>    Ca    7.8<L>      11 Dec 2023 04:54  Phos  6.1     12-11  Mg     2.8     12-11    TPro  6.2  /  Alb  1.8<L>  /  TBili  0.4  /  DBili  x   /  AST  153<H>  /  ALT  340<H>  /  AlkPhos  100  12-11    Coags:          ## Imaging:    ## Medications:      albuterol/ipratropium for Nebulization 3 milliLiter(s) Nebulizer every 6 hours    insulin lispro (ADMELOG) corrective regimen sliding scale   SubCutaneous every 6 hours    aspirin  chewable 81 milliGRAM(s) Chew daily  heparin   Injectable 5000 Unit(s) SubCutaneous every 8 hours      levETIRAcetam  IVPB 500 milliGRAM(s) IV Intermittent every 12 hours  midazolam Injectable 4 milliGRAM(s) IV Push every 2 hours PRN  propofol Infusion 10 MICROgram(s)/kG/Min IV Continuous <Continuous>      ## Vitals:  T(C): 36.6 (12-11-23 @ 12:00), Max: 38.3 (12-11-23 @ 04:30)  HR: 70 (12-11-23 @ 12:12) (64 - 94)  BP: --  BP(mean): --  RR: 30 (12-11-23 @ 12:00) (29 - 38)  SpO2: 97% (12-11-23 @ 12:12) (94% - 99%)  Wt(kg): --  Vent: Mode: AC/ CMV (Assist Control/ Continuous Mandatory Ventilation), RR (machine): 30, RR (patient): 30, TV (machine): 480, FiO2: 40, PEEP: 5, PIP: 29  ABG: ABG - ( 09 Dec 2023 14:39 )  pH, Arterial: 7.31  pH, Blood: x     /  pCO2: 38    /  pO2: 94    / HCO3: 19    / Base Excess: -6.6  /  SaO2: 98.2                  12-10 @ 07:01  -  12-11 @ 07:00  --------------------------------------------------------  IN: 1113 mL / OUT: 495 mL / NET: 618 mL    12-11 @ 07:01  - 12-11 @ 12:24  --------------------------------------------------------  IN: 272.5 mL / OUT: 125 mL / NET: 147.5 mL          ## P/E:  Gen: lying comfortably in bed in no apparent distress  Mouth: (+) ETT/OGT  Lungs: CTA  Heart: RRR  Abd: Soft/+BS/ Non-tender  Ext: Hand edema  Neuro: No gag reflex, (+) cough, no response to painful stimuli, (+) spont resp    CENTRAL LINE: [ ] YES [ ] NO  LOCATION:   DATE INSERTED:  REMOVE: [ ] YES [ ] NO      COWAN: [ ] YES [ ] NO    DATE INSERTED:  REMOVE:  [ ] YES [ ] NO      A-LINE:  [x ] YES [ ] NO  LOCATION: L radial   DATE INSERTED:  REMOVE:  [ ] YES [ ] NO  EXPLAIN:      CODE STATUS: [ ] full code  [x ] DNR  [ ] DNI  [ ] [facility]  Goals of care discussion: [ ] yes

## 2023-12-11 NOTE — DIETITIAN INITIAL EVALUATION ADULT - NS FNS DIET ORDER
Diet, NPO with Tube Feed:   Tube Feeding Modality: Nasogastric  Nepro with Carb Steady  Total Volume for 24 Hours (mL): 960  Continuous  Starting Tube Feed Rate {mL per Hour}: 10  Increase Tube Feed Rate by (mL): 10     Every 6 hours  Until Goal Tube Feed Rate (mL per Hour): 40  Tube Feed Duration (in Hours): 24  Tube Feed Start Time: 17:30 (12-10-23 @ 17:27)

## 2023-12-11 NOTE — PROGRESS NOTE ADULT - ASSESSMENT
Pt is an 88 yo F with h/o cardiomyopathy, HTN, and HLD BIBEMS accompanied by granddaughter 2 to SOB and cough with white phlegm. Admitting dx: RSV with superimposed CAP. RRT called pt found to be unresponsive on BIPAP then code blue called as pt was pulseless. Pt cardiac arrest with approx 16 min downtime with ROSC. EEG and CT head c/w anoxic brain injury. ICU dx: 1) Cardiac arrest with resultant anoxic encephalopathy 2) RSV    Resp/Social: Pt is DNR and family is considering palliative extubation/ Cont discussing GOC with family  ID: No Abx  CVS: Off Dobutamine and Levophed  HEME: DVT prophylaxis with sq Heparin  FEN: Cont enteral  Endo: Follow FS and cover with Lispro  Neuro: Cont follow neuro exam/ Myoclonic Sz cont Propofol + Keppra

## 2023-12-11 NOTE — DIETITIAN NUTRITION RISK NOTIFICATION - TREATMENT: THE FOLLOWING DIET HAS BEEN RECOMMENDED
Diet, NPO with Tube Feed:   Tube Feeding Modality: Orogastric  Nepro with Carb Steady  Total Volume for 24 Hours (mL): 540  Continuous  Until Goal Tube Feed Rate (mL per Hour): 30  Tube Feed Duration (in Hours): 18  Tube Feed Start Time: 17:00  Tube Feed Stop Time: 11:00 (12-11-23 @ 15:56) [Pending Verification By Attending]  Diet, NPO with Tube Feed:   Tube Feeding Modality: Nasogastric  Nepro with Carb Steady  Total Volume for 24 Hours (mL): 960  Continuous  Starting Tube Feed Rate {mL per Hour}: 10  Increase Tube Feed Rate by (mL): 10     Every 6 hours  Until Goal Tube Feed Rate (mL per Hour): 40  Tube Feed Duration (in Hours): 24  Tube Feed Start Time: 17:30 (12-10-23 @ 17:27) [Active]

## 2023-12-11 NOTE — DIETITIAN INITIAL EVALUATION ADULT - PERTINENT MEDS FT
MEDICATIONS  (STANDING):  albuterol/ipratropium for Nebulization 3 milliLiter(s) Nebulizer every 6 hours  aspirin  chewable 81 milliGRAM(s) Chew daily  chlorhexidine 0.12% Liquid 15 milliLiter(s) Oral Mucosa every 12 hours  chlorhexidine 2% Cloths 1 Application(s) Topical <User Schedule>  heparin   Injectable 5000 Unit(s) SubCutaneous every 8 hours  influenza  Vaccine (HIGH DOSE) 0.7 milliLiter(s) IntraMuscular once  insulin lispro (ADMELOG) corrective regimen sliding scale   SubCutaneous every 6 hours  levETIRAcetam  IVPB 500 milliGRAM(s) IV Intermittent every 12 hours  propofol Infusion 10 MICROgram(s)/kG/Min (6.02 mL/Hr) IV Continuous <Continuous>    MEDICATIONS  (PRN):  midazolam Injectable 4 milliGRAM(s) IV Push every 2 hours PRN Vent Synchrony/Myoclonus

## 2023-12-11 NOTE — DIETITIAN INITIAL EVALUATION ADULT - OTHER INFO
Pt is on contact isolation, OGT feeding of Nepro @ goal rate 40 mL/hr x 24 hrs=960 ml, 1728 adrian, 78 gm pro, 701 mL free water, 101% RDIs.   Pt also received 663 ml propofol(12/10)= 729.3 calories.  Pt is DNR, family considering palliative extubations as per chart review.

## 2023-12-12 NOTE — PROGRESS NOTE ADULT - SUBJECTIVE AND OBJECTIVE BOX
HPI:  Pt is an 86 yo F with h/o cardiomyopathy, HTN, and HLD BIBEMS accompanied by granddaughter 2 to SOB and cough with white phlegm. Admitting dx: RSV with superimposed CAP. RRT called pt found to be unresponsive on BIPAP then code blue called as pt was pulseless. Pt cardiac arrest with approx 16 min downtime with ROSC. EEG and CT head c/w anoxic brain injury. ICU dx: 1) Cardiac arrest with resultant anoxic encephalopathy 2) RSV. Family at the bedside; palliative extubation with comfort measures      ## Labs:  CBC:                        8.2    9.46  )-----------( 174      ( 12 Dec 2023 03:30 )             23.5     Chem:      131<L>  |  101  |  76<H>  ----------------------------<  119<H>  3.7   |  17<L>  |  3.93<H>    Ca    7.9<L>      12 Dec 2023 03:30  Phos  7.1       Mg     2.9         TPro  6.0  /  Alb  1.5<L>  /  TBili  0.5  /  DBili  x   /  AST  92<H>  /  ALT  231<H>  /  AlkPhos  101      Coags:          ## Imaging:    ## Medications:            glycopyrrolate Injectable 0.4 milliGRAM(s) IV Push every 6 hours    fentaNYL    Injectable 50 MICROGram(s) IV Push every 1 hour PRN  levETIRAcetam  IVPB 500 milliGRAM(s) IV Intermittent every 12 hours  LORazepam   Injectable 2 milliGRAM(s) IV Push every 2 hours PRN      ## Vitals:  T(C): 36.9 (23 @ 15:57), Max: 36.9 (23 @ 15:57)  HR: 0 (23 @ 15:57) (0 - 87)  BP: --  BP(mean): --  RR: 0 (23 @ 15:57) (0 - 34)  SpO2: 96% (23 @ 15:35) (94% - 100%)  Wt(kg): --  Vent: Mode: AC/ CMV (Assist Control/ Continuous Mandatory Ventilation), RR (machine): 30, RR (patient): 30, TV (machine): 480, FiO2: 35, PEEP: 5, PIP: 34  AB-11 @ 07:  -   @ 07:00  --------------------------------------------------------  IN: 1520.2 mL / OUT: 900 mL / NET: 620.2 mL     @ 07: @ 16:19  --------------------------------------------------------  IN: 302.8 mL / OUT: 210 mL / NET: 92.8 mL          ## P/E:  Gen: lying comfortably in bed in no apparent distress  Mouth: (+) ETT/OGT  Lungs: CTA  Heart: RRR  Abd: Soft/+BS/ Non-tender  Ext: Hand edema  Neuro: (+) tremors/ Minimum response to painful stimuli/ (+) spont resp    CENTRAL LINE: [ ] YES [ ] NO  LOCATION:   DATE INSERTED:  REMOVE: [ ] YES [ ] NO      CHAPO: [ ] YES [ ] NO    DATE INSERTED:  REMOVE:  [ ] YES [ ] NO      A-LINE:  [x ] YES [ ] NO  LOCATION: L radial   DATE INSERTED:  REMOVE:  [ ] YES [ ] NO  EXPLAIN:      CODE STATUS: [ ] full code  [x ] DNR  [ ] DNI  [ ] [facility]  Goals of care discussion: [x ] yes

## 2023-12-12 NOTE — CHART NOTE - NSCHARTNOTEFT_GEN_A_CORE
Patient noted to have asystole on monitor. Patient assessed at bedside by RANDOLPH Sousa. Patient without spontaneous respirations, cardiac activity, or heart sounds. Pupils enlarged, non-reactive to light. No pulses palpable. Pt pronounced at 15:57. Family at bedside informed. Emotional support provided.

## 2023-12-12 NOTE — PROGRESS NOTE ADULT - PROVIDER SPECIALTY LIST ADULT
Critical Care
Pulmonology
Critical Care
MICU
Pulmonology
Critical Care
MICU
Pulmonology
Critical Care
Critical Care

## 2023-12-12 NOTE — PROGRESS NOTE ADULT - NUTRITIONAL ASSESSMENT
This patient has been assessed with a concern for Malnutrition and has been determined to have a diagnosis/diagnoses of Morbid obesity (BMI > 40).

## 2023-12-12 NOTE — CHART NOTE - NSCHARTNOTESELECT_GEN_ALL_CORE
Event Note
code blue/Event Note
Death Pronouncement/Event Note
Family contact/Event Note
POCUS note/Event Note

## 2023-12-12 NOTE — DISCHARGE NOTE FOR THE EXPIRED PATIENT - HOSPITAL COURSE
86 yo F with h/o cardiomyopathy, HTN, and HLD BIBEMS accompanied by granddaughter 2 to SOB and cough with white phlegm. Admitting dx: RSV with superimposed CAP. RRT called pt found to be unresponsive on BIPAP then code blue called as pt was pulseless. Pt cardiac arrest with approx 16 min downtime with ROSC. EEG and CT head c/w anoxic brain injury. Family, together, made the decision to pursue palliative care. Patient palliatively extubated on 12/12/23. Passed at 15:57 with family surrounding her.

## 2023-12-12 NOTE — PROGRESS NOTE ADULT - REASON FOR ADMISSION
SOB from CAP

## 2023-12-12 NOTE — PROGRESS NOTE ADULT - ASSESSMENT
Pt is an 86 yo F with h/o cardiomyopathy, HTN, and HLD BIBEMS accompanied by granddaughter 2 to SOB and cough with white phlegm. Admitting dx: RSV with superimposed CAP. RRT called pt found to be unresponsive on BIPAP then code blue called as pt was pulseless. Pt cardiac arrest with approx 16 min downtime with ROSC. EEG and CT head c/w anoxic brain injury. ICU dx: 1) Cardiac arrest with resultant anoxic encephalopathy 2) RSV. Family at the bedside; palliative extubation with comfort measures       Pt is an 88 yo F with h/o cardiomyopathy, HTN, and HLD BIBEMS accompanied by granddaughter 2 to SOB and cough with white phlegm. Admitting dx: RSV with superimposed CAP. RRT called pt found to be unresponsive on BIPAP then code blue called as pt was pulseless. Pt cardiac arrest with approx 16 min downtime with ROSC. EEG and CT head c/w anoxic brain injury. ICU dx: 1) Cardiac arrest with resultant anoxic encephalopathy 2) RSV. Family at the bedside; palliative extubation with comfort measures

## 2023-12-13 LAB
CULTURE RESULTS: SIGNIFICANT CHANGE UP
SPECIMEN SOURCE: SIGNIFICANT CHANGE UP

## 2023-12-23 DIAGNOSIS — J18.9 PNEUMONIA, UNSPECIFIED ORGANISM: ICD-10-CM

## 2023-12-23 DIAGNOSIS — D64.9 ANEMIA, UNSPECIFIED: ICD-10-CM

## 2023-12-23 DIAGNOSIS — I35.0 NONRHEUMATIC AORTIC (VALVE) STENOSIS: ICD-10-CM

## 2023-12-23 DIAGNOSIS — N17.9 ACUTE KIDNEY FAILURE, UNSPECIFIED: ICD-10-CM

## 2023-12-23 DIAGNOSIS — R74.01 ELEVATION OF LEVELS OF LIVER TRANSAMINASE LEVELS: ICD-10-CM

## 2023-12-23 DIAGNOSIS — R57.8 OTHER SHOCK: ICD-10-CM

## 2023-12-23 DIAGNOSIS — J96.01 ACUTE RESPIRATORY FAILURE WITH HYPOXIA: ICD-10-CM

## 2023-12-23 DIAGNOSIS — J96.02 ACUTE RESPIRATORY FAILURE WITH HYPERCAPNIA: ICD-10-CM

## 2023-12-23 DIAGNOSIS — J12.1 RESPIRATORY SYNCYTIAL VIRUS PNEUMONIA: ICD-10-CM

## 2023-12-23 DIAGNOSIS — I42.9 CARDIOMYOPATHY, UNSPECIFIED: ICD-10-CM

## 2023-12-23 DIAGNOSIS — G93.1 ANOXIC BRAIN DAMAGE, NOT ELSEWHERE CLASSIFIED: ICD-10-CM

## 2023-12-23 DIAGNOSIS — Z66 DO NOT RESUSCITATE: ICD-10-CM

## 2023-12-23 DIAGNOSIS — Z11.52 ENCOUNTER FOR SCREENING FOR COVID-19: ICD-10-CM

## 2023-12-23 DIAGNOSIS — Z79.82 LONG TERM (CURRENT) USE OF ASPIRIN: ICD-10-CM

## 2023-12-23 DIAGNOSIS — Z51.5 ENCOUNTER FOR PALLIATIVE CARE: ICD-10-CM

## 2023-12-23 DIAGNOSIS — Z88.5 ALLERGY STATUS TO NARCOTIC AGENT: ICD-10-CM

## 2023-12-23 DIAGNOSIS — I10 ESSENTIAL (PRIMARY) HYPERTENSION: ICD-10-CM

## 2023-12-23 DIAGNOSIS — E66.01 MORBID (SEVERE) OBESITY DUE TO EXCESS CALORIES: ICD-10-CM

## 2023-12-23 DIAGNOSIS — I46.9 CARDIAC ARREST, CAUSE UNSPECIFIED: ICD-10-CM

## 2024-01-03 ENCOUNTER — APPOINTMENT (OUTPATIENT)
Dept: OPHTHALMOLOGY | Facility: CLINIC | Age: 89
End: 2024-01-03

## 2024-03-28 ENCOUNTER — APPOINTMENT (OUTPATIENT)
Dept: OPHTHALMOLOGY | Facility: CLINIC | Age: 89
End: 2024-03-28